# Patient Record
Sex: MALE | Race: WHITE | Employment: OTHER | URBAN - METROPOLITAN AREA
[De-identification: names, ages, dates, MRNs, and addresses within clinical notes are randomized per-mention and may not be internally consistent; named-entity substitution may affect disease eponyms.]

---

## 2023-07-13 ENCOUNTER — HOSPITAL ENCOUNTER (OUTPATIENT)
Age: 88
Setting detail: THERAPIES SERIES
Discharge: HOME | End: 2023-07-13
Payer: MEDICARE

## 2023-07-13 PROCEDURE — 97162 PT EVAL MOD COMPLEX 30 MIN: CPT

## 2023-07-13 NOTE — CONSULTS
Falls  Exercises:  Exercise Reps/ Time Weight/ Level Comments   Timed Gait 113 Feet  1 Minute And 16 Seconds    Number of Steps In 30 Feet 29 And 25     Walking With Theraband For Resisted Hip Flexion      Sit To Stands                                                      Solo Step      Standing Feet Together      Standing With Trunk AROM      Standing Placing One Foot On A Step With Decreasing UE Support      Step-Ups                                                  Pt. Education:  [x] Plans/Goals, Risks/Benefits discussed  [] Home exercise program:   Method of Education: [x] Verbal  [] Demo  [] Written  Comprehension of Education:  [] Verbalizes understanding. [] Demonstrates understanding. [] Needs Review. [] Demonstrates/verbalizes understanding of HEP/Ed previously given. Specific Instructions for next treatment[de-identified] Progress Gait And Therapeutic Exercises. Evaluation Complexity:  History (Personal factors, comorbidities) [] 0 [x] 1-2 [] 3+   Exam (limitations, restrictions) [] 1-2 [x] 3 [] 4+   Clinical presentation (progression) [x] Stable [] Evolving  [] Unstable   Decision Making [] Low [x] Moderate [] High    [] Low Complexity [x] Moderate Complexity [] High Complexity       Treatment Charges: Mins Time In/Out Units   [x] Evaluation       []  Low       [x]  Moderate       []  High 45 0930/1015 1   []  Modalities      []  Ther Exercise      []  Manual Therapy      []  Ther Activities      []  Aquatics      []  Vasocompression      []  Other        TOTAL TREATMENT TIME: 39      Time in:0930     Time out:1015    Electronically signed by: Jose Luis Olvera PT        Physician Signature:________________________________Date:__________________  By signing above or cosigning this note, I have reviewed this plan of care and certify a need for medically necessary rehabilitation services.      *PLEASE SIGN ABOVE AND FAX BACK ALL PAGES*

## 2023-07-20 ENCOUNTER — HOSPITAL ENCOUNTER (OUTPATIENT)
Age: 88
Setting detail: THERAPIES SERIES
Discharge: HOME | End: 2023-07-20
Payer: MEDICARE

## 2023-07-20 NOTE — FLOWSHEET NOTE
[x] [address]: [phone]     Physical Therapy Cancel/No Show note    Date: 2023  Patient: Chino Vasquez  : 1935  MRN: [id number]    Visit Count:   Cancels/No Shows to date:     For today's appointment patient:    [x]  Cancelled    [] Rescheduled appointment    [] No-show     Reason given by patient:    []  Patient ill    []  Conflicting appointment    [] No transportation      [] Conflict with work    [] No reason given    [] Weather related    [] [id number]    [x] Other: confused by appointment time.   No transportation    Comments:        [x] Next appointment was confirmed    Electronically signed by: Chapito Long PTA

## 2023-07-24 ENCOUNTER — HOSPITAL ENCOUNTER (OUTPATIENT)
Age: 88
Setting detail: THERAPIES SERIES
Discharge: HOME | End: 2023-07-24
Payer: MEDICARE

## 2023-07-24 PROCEDURE — 97110 THERAPEUTIC EXERCISES: CPT

## 2023-07-24 PROCEDURE — 97116 GAIT TRAINING THERAPY: CPT

## 2023-07-26 ENCOUNTER — HOSPITAL ENCOUNTER (OUTPATIENT)
Age: 88
Setting detail: THERAPIES SERIES
Discharge: HOME | End: 2023-07-26
Payer: MEDICARE

## 2023-07-26 NOTE — FLOWSHEET NOTE
[x] [address]  [phone]  F: [phone]             Physical Therapy Daily Treatment Note    Date:  2023  Patient Name:  Alison Meier    :  1935  MRN: [id number]  Physician: Audelia Eisenmenger, MD                                 Insurance: [facility]  Medical Diagnosis: Low Back Pain                Rehab Codes: M54.50   Onset Date: January 15, 2023                                 Next 's appt: N/A  Visit# / total visits: ; Progress note for Medicare patient due at visit 10     Cancels/No Shows: 0/0    Subjective:    Pain:  [x] Yes  [] No Location: Right Thigh Pain Rating: (0-10 scale) 2/10  Pain altered Tx:  [x] No  [] Yes  Action:  Comments: Pt reports no adverse response to previous Therapy session. He states he has been trying to ambulate more often within his home using the 4-wheeled walker. He denies Falls. Objective: None  Modalities: None  Precautions: Fall  Exercises:  Exercise Reps/ Time Weight/ Level Comments   Timed Gait 146 Feet  1 Minute And 36 Seconds     Number of Steps In 30 Feet 25 And 23       Walking With Theraband For Resisted Hip Flexion  30 Feet x2  Blue     Sit To Stands  10                                                                                       Solo Step         Standing Feet Together  60 Seconds       Standing With Trunk AROM  10       Standing Placing One Foot On A Step With Decreasing UE Support  10  4 Inch Step  All The Way To No Hands   Step-Ups  10  4 Inch  With 4-Wheeled Walker   Other:      Treatment Charges: Mins Units   []  Modalities     [x]  Ther Exercise 30 2   []  Manual Therapy     []  Ther Activities     []  Neuro Re-ed     []  Vasocompression     [x] Gait 15 1   []  Other     Total Treatment time 45        Assessment: [x] Progressing toward goals. [] No change.      [] Other:  [x] Patient would continue to benefit from skilled physical therapy

## 2023-07-26 NOTE — FLOWSHEET NOTE
[x] [address] [facility]    [phone]  F: [phone]          Physical Therapy Cancel/No Show note    Date: 2023  Patient: Miriam Cash  : 1935  MRN: [id number]    Cancels/No Shows to date:     For today's appointment patient:    [x]  Cancelled    [] Rescheduled appointment    [] No-show     Reason given by patient:    []  Patient ill    []  Conflicting appointment    [] No transportation      [] Conflict with work    [] No reason given    [x] Weather related    [] COVID-19    [] Other:      Comments:        [x] Next appointment was confirmed    Electronically signed by: Jacqui Pate PT

## 2023-07-28 ENCOUNTER — HOSPITAL ENCOUNTER (OUTPATIENT)
Age: 88
Setting detail: THERAPIES SERIES
Discharge: HOME | End: 2023-07-28
Payer: MEDICARE

## 2023-07-28 PROCEDURE — 97116 GAIT TRAINING THERAPY: CPT

## 2023-07-28 PROCEDURE — 97110 THERAPEUTIC EXERCISES: CPT

## 2023-07-28 NOTE — FLOWSHEET NOTE
[x] [address]  [phone]  F: [phone]             Physical Therapy Daily Treatment Note    Date:  2023  Patient Name:  Diane Denver    :  1935  MRN: [id number]  Physician: Montez Cooper MD                                 Insurance: [facility]  Medical Diagnosis: Low Back Pain                Rehab Codes: M54.50   Onset Date: January 15, 2023                                 Next 's appt: N/A  Visit# / total visits: 3/24; Progress note for Medicare patient due at visit 10     Cancels/No Shows: 0/0    Subjective:    Pain:  [x] Yes  [] No Location: Right Thigh Pain Rating: (0-10 scale) 2/10  Pain altered Tx:  [x] No  [] Yes  Action:  Comments: Pt reports no adverse response to previous Therapy session. He states he has been trying to ambulate more often within his home using the 4-wheeled walker. He denies Falls. Objective: None  Modalities: None  Precautions: Fall  Exercises:  Exercise Reps/ Time Weight/ Level Comments   Timed Gait 146 Feet  1 Minute And 22 Seconds     Number of Steps In 30 Feet 20 And 19       Walking With Theraband For Resisted Hip Flexion  30 Feet x2  Blue     Sit To Stands  12                                                                                       Solo Step         Standing Feet Together  60 Seconds       Standing With Trunk AROM  10       Standing Placing One Foot On A Step With Decreasing UE Support  10  4 Inch Step  All The Way To No Hands   Step-Ups  10  4 Inch  With 4-Wheeled Walker   Other:      Treatment Charges: Mins Units   []  Modalities     [x]  Ther Exercise 30 2   []  Manual Therapy     []  Ther Activities     []  Neuro Re-ed     []  Vasocompression     [x] Gait 15 1   []  Other     Total Treatment time 45        Assessment: [x] Progressing toward goals. [] No change.      [] Other:  [x] Patient would continue to benefit from skilled physical therapy

## 2023-07-31 ENCOUNTER — HOSPITAL ENCOUNTER (OUTPATIENT)
Age: 88
Setting detail: THERAPIES SERIES
Discharge: HOME | End: 2023-07-31
Payer: MEDICARE

## 2023-07-31 ENCOUNTER — APPOINTMENT (OUTPATIENT)
Age: 88
End: 2023-07-31
Payer: MEDICARE

## 2023-07-31 PROCEDURE — 97110 THERAPEUTIC EXERCISES: CPT

## 2023-07-31 PROCEDURE — 97116 GAIT TRAINING THERAPY: CPT

## 2023-07-31 NOTE — FLOWSHEET NOTE
Physical Therapy Daily Treatment      Visit Count: 4  Plan of Care/Certification Dates: Initial: 5/8/2017 Through: 7/17/2017  Insurance Information:   Medicare is primary. No authorization is required. Check the Common Working / Emulator file for therapy cap $ amount.  PT/SLP: $0  OT: $0  Date checked:5/10/17  Secondary insurance:[employer]CHANGE/[employer] [id number]  Next Referring Provider Visit: TBD    Referred by: Dr. Kofi Mckeon MD  Medical Diagnosis (from order):  Hip flexor tendonitis, left [M76.892]   Insurance: 1. MEDICARE  2. [employer]    Date of Onset: new onset; 1.5 months ago  Diagnosis Precautions: none  Relevant co-morbidities and medications: History of schizoaffective disorder, depression, anxiety, seizure disorder- last seizure 11/214, HTN, Right RTC repair. Please refer to PTA Medication section of electronic health record.     Relevant Tests: Relevant diagnostic tests: plain film radiograph   4/27/17 FINDINGS:  Mild, moderate hip osteoarthritic changes noted, with subchondral  sclerosis, osteophytic spurring, and joint space narrowing.   No fracture or dislocation.     SUBJECTIVE     Current Pain: 1/10   Functional Change: Pain went up to 6/10 three times this last week and lasted 30 minutes when I was cleaning (points to lateral left hip near iliac crest)    OBJECTIVE   Gait: Patient ambulating with increased bilateral  hip flexion, decreased upright posture, with single point cane in right hand.       Norm Left Left   Date   Initial 5/31    Flexion 110-120  102  105   Extension 10-15 NT     Abduction 30-50  18*  30   Adduction 30 20 20    Internal Rotation 30-40  30 30   External Rotation 40-60  33 33    [standard testing positions unless otherwise noted]   Comments: * denotes pain    Treatment   *denotes added to home exercise program     Therapeutic Exercise:   Recumbent bike seat level 5, resistance level 3, 5 minutes  -subjective info taken  Side stepping with orange TB x 30  [x] [address]  [phone]  F: [phone]             Physical Therapy Daily Treatment Note    Date:  2023  Patient Name:  Tj Lal    :  1935  MRN: [id number]  Physician: Abdelrahman Alves MD                                 Insurance: [facility]  Medical Diagnosis: Low Back Pain                Rehab Codes: M54.50   Onset Date: January 15, 2023                                 Next 's appt: N/A  Visit# / total visits: ; Progress note for Medicare patient due at visit 10     Cancels/No Shows: 0/0    Subjective:    Pain:  [x] Yes  [] No Location: Right Thigh Pain Rating: (0-10 scale) 2/10  Pain altered Tx:  [x] No  [] Yes  Action:  Comments: Pt reports no adverse response to previous Therapy session. He states his Low Back has been bothering him more the past few days for no apparent reason. He continues to also c/o Right Groin pain with ambulation. Objective: None  Modalities: None  Precautions: Fall  Exercises:  Exercise Reps/ Time Weight/ Level Comments   Timed Gait 146 Feet  1 Minute And 15 Seconds     Number of Steps In 30 Feet  15, 17, 16 And 16       Walking With Theraband For Resisted Hip Flexion  30 Feet x4  Blue     Sit To Stands  12                                                                                       Solo Step         Walking Without Device  12 Feet  x4     Standing Feet Together  60 Seconds       Standing With Trunk AROM  10       Standing Placing One Foot On A Step With Decreasing UE Support  10  4 Inch Step  All The Way To No Hands   Step-Ups  10  4 Inch  With 4-Wheeled Walker   Other:      Treatment Charges: Mins Units   []  Modalities     [x]  Ther Exercise 30 2   []  Manual Therapy     []  Ther Activities     []  Neuro Re-ed     []  Vasocompression     [x] Gait 15 1   []  Other     Total Treatment time 45        Assessment: [x] Progressing toward goals.     [] No feet each direction  Standing hip EXT x 10 x 2 sets with VCs to prevent trunk movements  Standing tensor fascia srini stretch 30 sec x 2 each set*  Supine tensor fascia srini stretch 30 sec x 2*    Therapeutic Activity:  Proper standing posture with shoulders, buttocks and heels touching wall    Manual Therapy:   Foam roll to left tensor fascia srini      Current Home Program (not performed this date except as noted above):   Supine left hip flexor stretch 30 sec x 2  Seated HS stretch 30 sec x 2 each leg  Standing hip ABD  Standing hip EXT  Side lying hip series x 10 each: clams, reverse clams, hip ABD  Standing tensor fascia srini stretch 30 sec x 2 each set*  Supine tensor fascia srini stretch 30 sec x 2*  ASSESSMENT   Patient tolerating exercises well but is still experiencing tightness and pain reproduction in left tensor fascia srini.     Pain after treatment: 0/10  Result of above outlined education: Verbalizes understanding and Needs reinforcement      Goals:  To be obtained by end of this plan of care:  1. Patient independent with modified and progressed home exercise program.  2. Patient will decrease involved hip pain to 2/10 to aid in ambulating on level and unlevel surfaces, sitting/standing to cook/eat a meal and squatting for lifting for household independent living.   3. Patient will increase involved hip active range of motion to WFL° to aid in normalization of gait for independent living, completion of household tasks for independent living and sitting/standing to cook/eat a meal.  4. Patient will tolerate ambulating 30 minutes with minimal left hip pain with single point cane to aid in completion of household and community tasks for independent living.   Lower Extremity Functional Scale: Patient will complete form to reflect an improved score from initial score of 46 to greater than or equal to 56 (0=extreme difficulty; 80=no difficulty) to indicate pt reported improvement in  function/disability/impairment (minimal detectable change: 9 points).       PLAN   Continue manual to decrease restrictions in tensor fascia srini, gluteus medius, and anterior hip capsule   Review proper posture for gait and standing  Continue hip ABD and EXT strength exercises  Work on DC over next 1-3 visits  THERAPY DAILY BILLING   Primary Insurance: MEDICARE  Secondary Insurance: [employer]CHANGE    Evaluation Procedures:  No evaluation codes were used on this date of service    Timed Procedures:  Manual Therapy, 20 minutes  Therapeutic Exercise, 25 minutes    Untimed Procedures:  No untimed codes were used on this date of service     Total Treatment Time: 45minutes      G-Code:  G-Code Score ABN form  insurance type does not require G-codes  Modifier based on outcome measure(s)/functional testing/clinical judgement as listed above    The referring provider's electronic or written signature on the evaluation authorizes the therapy plan of care and certifies the need for these services, furnished under this plan of care while under their care.  Electronically sent for physician signature

## 2023-08-03 ENCOUNTER — HOSPITAL ENCOUNTER (OUTPATIENT)
Age: 88
Setting detail: THERAPIES SERIES
Discharge: HOME | End: 2023-08-03
Payer: MEDICARE

## 2023-08-03 PROCEDURE — 97116 GAIT TRAINING THERAPY: CPT

## 2023-08-03 PROCEDURE — 97110 THERAPEUTIC EXERCISES: CPT

## 2023-08-03 NOTE — FLOWSHEET NOTE
[x] [address] [facility]  [phone]  F: [phone]             Physical Therapy Daily Treatment Note    Date:  2023  Patient Name:  Sheree Rowley    :  1935  MRN: [id number]  Physician: Shaun Perry MD                                 Insurance: [facility]  Medical Diagnosis: Low Back Pain                Rehab Codes: M54.50   Onset Date: January 15, 2023                                 Next 's appt: N/A  Visit# / total visits: ; Progress note for Medicare patient due at visit 10     Cancels/No Shows: 0/0    Subjective:    Pain:  [x] Yes  [] No Location: Right Thigh Pain Rating: (0-10 scale) 2/10  Pain altered Tx:  [x] No  [] Yes  Action:  Comments: Pt reports no adverse response to previous Therapy session. He states he continues to have Right Groin pain with ambulation. Objective: None  Modalities: None  Precautions: Fall  Exercises:  Exercise Reps/ Time Weight/ Level Comments   Timed Gait 146 Feet  1 Minute And 16 Seconds     Number of Steps In 30 Feet  20, 17, 17 And 16       Walking With Theraband For Resisted Hip Flexion  30 Feet x4  Blue     Sit To Stands  12                                                                                       Solo Step         Walking Without Device  12 Feet  x4     Standing Feet Together  60 Seconds       Standing With Trunk AROM  10       Standing Placing One Foot On A Step With Decreasing UE Support  10  4 Inch Step  All The Way To No Hands   Step-Ups  10  4 Inch  With 4-Wheeled Walker   Other:      Treatment Charges: Mins Units   []  Modalities     [x]  Ther Exercise 30 2   []  Manual Therapy     []  Ther Activities     []  Neuro Re-ed     []  Vasocompression     [x] Gait 15 1   []  Other     Total Treatment time 45        Assessment: [x] Progressing toward goals. [] No change.      [] Other:  [x] Patient would continue to benefit from skilled physical therapy

## 2023-08-07 ENCOUNTER — HOSPITAL ENCOUNTER (OUTPATIENT)
Age: 88
Setting detail: THERAPIES SERIES
Discharge: HOME | End: 2023-08-07
Payer: MEDICARE

## 2023-08-07 PROCEDURE — 97110 THERAPEUTIC EXERCISES: CPT

## 2023-08-07 PROCEDURE — 97116 GAIT TRAINING THERAPY: CPT

## 2023-08-07 NOTE — FLOWSHEET NOTE
[x] [address] [facility]  [phone]  F: [phone]             Physical Therapy Daily Treatment Note    Date:  2023  Patient Name:  Yimi White    :  1935  MRN: [id number]  Physician: Xuan Devine MD                                 Insurance: [facility]  Medical Diagnosis: Low Back Pain                Rehab Codes: M54.50   Onset Date: January 15, 2023                                 Next 's appt: N/A  Visit# / total visits: ; Progress note for Medicare patient due at visit 10     Cancels/No Shows: 0/0    Subjective:    Pain:  [x] Yes  [] No Location: Right Thigh Pain Rating: (0-10 scale) 2/10  Pain altered Tx:  [x] No  [] Yes  Action:  Comments: Pt reports no adverse response to previous Therapy session. He states he is not overly tired after Therapy. He denies Right Hip pain at present.     Objective: None  Modalities: None  Precautions: Fall  Exercises:  Exercise Reps/ Time Weight/ Level Comments   Timed Gait 284 Feet  2 Minute And 12 Seconds     Number of Steps In 30 Feet  18, 17, 17 And 17       Walking With Theraband For Resisted Hip Flexion  143 Feet   Blue     Sit To Stands  15                                                                                       Solo Step         Walking Without Device  12 Feet  x4     Standing Feet Together  60 Seconds       Standing With Trunk AROM  10       Standing Placing One Foot On A Step With Decreasing UE Support  10  4 Inch Step  All The Way To No Hands   Step-Ups  10  4 Inch  With 4-Wheeled Walker   Other:      Treatment Charges: Mins Units   []  Modalities     [x]  Ther Exercise 30 2   []  Manual Therapy     []  Ther Activities     []  Neuro Re-ed     []  Vasocompression     [x] Gait 15 1   []  Other     Total Treatment time 45        Assessment: Pt presents this date reporting improving Gait and Balance with no Falls or Near Falls since his previous Therapy

## 2023-08-09 ENCOUNTER — HOSPITAL ENCOUNTER (OUTPATIENT)
Age: 88
Setting detail: THERAPIES SERIES
Discharge: HOME | End: 2023-08-09
Payer: MEDICARE

## 2023-08-09 PROCEDURE — 97110 THERAPEUTIC EXERCISES: CPT

## 2023-08-09 PROCEDURE — 97116 GAIT TRAINING THERAPY: CPT

## 2023-08-09 NOTE — FLOWSHEET NOTE
[x] [address]  [phone]  F: [phone]             Physical Therapy Daily Treatment Note    Date:  2023  Patient Name:  Mine Mail    :  1935  MRN: [id number]  Physician: Priscilla Rothman MD                                 Insurance: [facility]  Medical Diagnosis: Low Back Pain                Rehab Codes: M54.50   Onset Date: January 15, 2023                                 Next 's appt: N/A  Visit# / total visits: ; Progress note for Medicare patient due at visit 10     Cancels/No Shows: 0/0    Subjective:    Pain:  [x] Yes  [] No Location: Right Thigh Pain Rating: (0-10 scale) 2/10  Pain altered Tx:  [x] No  [] Yes  Action:  Comments: Pt reports no adverse response to previous Therapy session. He states he has been experiencing intermittent Right Groin pain with certain movements/activites. Objective: None  Modalities: None  Precautions: Fall  Exercises:  Exercise Reps/ Time Weight/ Level Comments   Timed Gait 284 Feet  2 Minute And 16 Seconds     Number of Steps In 30 Feet  16, 15, 15 And 16       Walking With Theraband For Resisted Hip Flexion  143 Feet   Blue     Sit To Stands  15                                                                                       Solo Step         Walking Without Device  12 Feet  x4     Standing Feet Together  60 Seconds       Standing With Trunk AROM  10       Standing Placing One Foot On A Step With Decreasing UE Support  10  4 Inch Step  All The Way To No Hands   Step-Ups  10  6 Inch  With 4-Wheeled Walker   Other:      Treatment Charges: Mins Units   []  Modalities     [x]  Ther Exercise 30 2   []  Manual Therapy     []  Ther Activities     []  Neuro Re-ed     []  Vasocompression     [x] Gait 15 1   []  Other     Total Treatment time 45        Assessment: Progressed Gait and Exercises this date. No Goals Met.     STG: (to be met in 24 treatments)  Improved

## 2023-08-11 ENCOUNTER — HOSPITAL ENCOUNTER (OUTPATIENT)
Age: 88
Setting detail: THERAPIES SERIES
Discharge: HOME | End: 2023-08-11
Payer: MEDICARE

## 2023-08-11 PROCEDURE — 97116 GAIT TRAINING THERAPY: CPT

## 2023-08-11 PROCEDURE — 97110 THERAPEUTIC EXERCISES: CPT

## 2023-08-11 NOTE — FLOWSHEET NOTE
[x] [address]  [phone]  F: [phone]             Physical Therapy Daily Treatment Note    Date:  2023  Patient Name:  Janene Aguilar    :  1935  MRN: [id number]  Physician: Makeda Schaffer MD                                 Insurance: [facility]  Medical Diagnosis: Low Back Pain                Rehab Codes: M54.50   Onset Date: January 15, 2023                                 Next 's appt: N/A  Visit# / total visits: ; Progress note for Medicare patient due at visit 10     Cancels/No Shows: 0/0    Subjective:    Pain:  [x] Yes  [] No Location: Right Thigh Pain Rating: (0-10 scale) 2/10  Pain altered Tx:  [x] No  [] Yes  Action:  Comments: Pt reports no adverse response to previous Therapy session. He states he has been experiencing intermittent Right Groin pain with certain movements/activites. Objective: None  Modalities: None  Precautions: Fall  Exercises:  Exercise Reps/ Time Weight/ Level Comments   Timed Gait 286 Feet  2 Minute And 25 Seconds     Number of Steps In 30 Feet  17, 17, 17 And 16       Walking With Theraband For Resisted Hip Flexion  143 Feet   Blue     Sit To Stands  15                                                                                       Solo Step         Walking Without Device  12 Feet  x4     Standing Feet Together  60 Seconds       Standing With Trunk AROM  10       Standing Placing One Foot On A Step With Decreasing UE Support  10  4 Inch Step  All The Way To No Hands   Step-Ups  10  6 Inch  With 4-Wheeled Walker   Other:      Treatment Charges: Mins Units   []  Modalities     [x]  Ther Exercise 30 2   []  Manual Therapy     []  Ther Activities     []  Neuro Re-ed     []  Vasocompression     [x] Gait 15 1   []  Other     Total Treatment time 45        Assessment: Progressed Gait and Exercises this date. No Goals Met.     STG: (to be met in 24 treatments)  Improved

## 2023-08-14 ENCOUNTER — HOSPITAL ENCOUNTER (OUTPATIENT)
Age: 88
Setting detail: THERAPIES SERIES
Discharge: HOME | End: 2023-08-14
Payer: MEDICARE

## 2023-08-14 PROCEDURE — 97110 THERAPEUTIC EXERCISES: CPT

## 2023-08-14 PROCEDURE — 97116 GAIT TRAINING THERAPY: CPT

## 2023-08-16 ENCOUNTER — HOSPITAL ENCOUNTER (OUTPATIENT)
Age: 88
Setting detail: THERAPIES SERIES
Discharge: HOME | End: 2023-08-16
Payer: MEDICARE

## 2023-08-16 PROCEDURE — 97116 GAIT TRAINING THERAPY: CPT

## 2023-08-16 PROCEDURE — 97110 THERAPEUTIC EXERCISES: CPT

## 2023-08-16 NOTE — FLOWSHEET NOTE
understanding. [x] Needs review. [] Demonstrates/verbalizes HEP/Ed previously given. Plan: [x] Continue current frequency toward long and short term goals. [x] Specific Instructions for subsequent treatments: Progress Gait and Exercises.       Time In:1330          Time Out: 1415    Electronically signed by:  Rich Montoya PT

## 2023-08-16 NOTE — FLOWSHEET NOTE
Goals Met. STG: (to be met in 24 treatments)  Improved Timed Gait to 143 Feet in 1 Minute with 4-Wheeled Rolling Walker to decrease risk of Falls. Improve Number of Steps in 30 Feet to 21 Steps to decrease risk of Falls. Improve Bilat Knee Extensor Strength to 4+/5 to facilitate Arising and Gait. Independent with Home Exercise Programs. LTG: (to be met in 24 treatments)  Improve Tinetti Balance Assessment Tool Score from 9/28 to 15/28 to improve Gait and decrease risk of Falls. Pt. Education:  [x] Yes  [] No  [] Reviewed Prior HEP/Ed  Method of Education: [x] Verbal  [x] Demo  [] Written  Comprehension of Education:  [x] Verbalizes understanding. [x] Demonstrates understanding. [x] Needs review. [] Demonstrates/verbalizes HEP/Ed previously given. Plan: [x] Continue current frequency toward long and short term goals. [x] Specific Instructions for subsequent treatments: Progress Gait and Exercises.       Time In:1415          Time Out: 1500    Electronically signed by:  Sonya Dailey, PT

## 2023-08-18 ENCOUNTER — HOSPITAL ENCOUNTER (OUTPATIENT)
Age: 88
Setting detail: THERAPIES SERIES
Discharge: HOME | End: 2023-08-18
Payer: MEDICARE

## 2023-08-18 PROCEDURE — 97110 THERAPEUTIC EXERCISES: CPT

## 2023-08-18 PROCEDURE — 97116 GAIT TRAINING THERAPY: CPT

## 2023-08-18 NOTE — FLOWSHEET NOTE
[x] [address] [facility]  [phone]  F: [phone]             Physical Therapy Daily Treatment Note    Date:  2023  Patient Name:  Robinson Chisholm    :  1935  MRN: [id number]  Physician: Howard Mane MD                                 Insurance: [facility]  Medical Diagnosis: Low Back Pain                Rehab Codes: M54.50   Onset Date: January 15, 2023                                 Next 's appt: N/A  Visit# / total visits: ; Progress note for Medicare patient due at visit 10     Cancels/No Shows: 0/0    Subjective:    Pain:  [x] Yes  [] No Location: Right Thigh Pain Rating: (0-10 scale) 0/10  Pain altered Tx:  [x] No  [] Yes  Action:  Comments: Pt reports no adverse response to previous Therapy session. He states he has gone out twice walking in the community with his spouse while shopping at large box stores. He denies Right Hip pain at present, however notes pain with certain movements. Objective:   Pt presents ambulating with a 4-Wheeled Rolling Walker demonstrating decreased Bilat Knee Extension during Stance Phases of Gait. He demonstrates decreased Heel Strike Bilat with Bilat AFOs. Pt does not consistently pass Stance Foot with Swing Foot. Standing Posture: Right Shift and Forward Flexed with decreased Lumbar Lordosis. Timed Gait: 113 Feet in 1 Minute and 16 Seconds with 4-Wheeled Rolling Walker. Number of Steps in 30 Feet: 34 and 25 Steps. LE Strength: Hip Flexors Right 4/5 and Left 4+/5, Hip IR 4/5 Bilat, Hip ER 4/5, Knee Extensors 4/5 Bilat, and Knee Flexors 4/5 Bilat. SLR: 35 degrees Bilat  Hip PROM: Right Hip IR and ER produce Right Hip pain. Right Hip Scour (+)  Standing Static Balance without UE support: Poor  Standing Dynamic Balance without UE support: Poor  Tinetti Balance Assessment Tool Score: 9/28  Above recorded at Initial Evaluation on 2023.     Pt presents ambulating

## 2023-08-21 ENCOUNTER — HOSPITAL ENCOUNTER (OUTPATIENT)
Age: 88
Setting detail: THERAPIES SERIES
Discharge: HOME | End: 2023-08-21
Payer: MEDICARE

## 2023-08-21 PROCEDURE — 97110 THERAPEUTIC EXERCISES: CPT

## 2023-08-21 PROCEDURE — 97116 GAIT TRAINING THERAPY: CPT

## 2023-08-21 NOTE — FLOWSHEET NOTE
Demonstrates understanding. [x] Needs review. [] Demonstrates/verbalizes HEP/Ed previously given. Plan: [x] Continue current frequency toward long and short term goals. [x] Specific Instructions for subsequent treatments: Progress Gait and Exercises.       Time [id number]          Time Out: 5577    Electronically signed by:  Ziggy Erickson PT

## 2023-08-23 ENCOUNTER — HOSPITAL ENCOUNTER (OUTPATIENT)
Age: 88
Setting detail: THERAPIES SERIES
Discharge: HOME | End: 2023-08-23
Payer: MEDICARE

## 2023-08-23 PROCEDURE — 97116 GAIT TRAINING THERAPY: CPT

## 2023-08-23 PROCEDURE — 97110 THERAPEUTIC EXERCISES: CPT

## 2023-08-23 NOTE — FLOWSHEET NOTE
a 4-Wheeled Rolling Walker demonstrating decreased Bilat Knee Extension during Stance Phase of Gait. Timed Gait: 143 Feet in 1 Minute and 10 Seconds. Number of Steps in 30 Feet: 22 Steps. Tinetti Balance Assessment Tool Score: 11/28  Above recorded at Progress Summary on August 16, 2023. Modalities: None  Precautions: Fall  Exercises:  Exercise Reps/ Time Weight/ Level Comments   Timed Gait 286 Feet  2 Minute And 17 Seconds     Number of Steps In 30 Feet  15, 16, 16 And 15       Walking With Theraband For Resisted Hip Flexion  286 Feet   Blue     Sit To Stands  15                                                                                       Solo Step         Walking With [employer]  12 Feet    x6     Standing Feet Together  60 Seconds       Standing With Trunk AROM  10       Standing Placing One Foot On A Step With St [place]  10  4 Inch Step     Step-Ups At Steps  10  6 Inch  With Bilat Hand Rails   Other:      Treatment Charges: Mins Units   []  Modalities     [x]  Ther Exercise 30 2   []  Manual Therapy     []  Ther Activities     []  Neuro Re-ed     []  Vasocompression     [x] Gait 15 1   []  Other     Total Treatment time 45        Assessment:  Progressing towards Short Term Goals. Continued with [employer] in Solo Step this date versus without Assistive Device. STG: (to be met in 24 treatments)  Improved Timed Gait to 143 Feet in 1 Minute with 4-Wheeled Rolling Walker to decrease risk of Falls. Improve Number of Steps in 30 Feet to 21 Steps to decrease risk of Falls. Improve Bilat Knee Extensor Strength to 4+/5 to facilitate Arising and Gait. Independent with Home Exercise Programs. LTG: (to be met in 24 treatments)  Improve Tinetti Balance Assessment Tool Score from 9/28 to 15/28 to improve Gait and decrease risk of Falls.        Pt. Education:  [x] Yes  [] No  [] Reviewed Prior HEP/Ed  Method of Education: [x] Verbal  [x] Demo  [] Written  Comprehension of Education:  [x] [employer]

## 2023-08-28 ENCOUNTER — HOSPITAL ENCOUNTER (OUTPATIENT)
Age: 88
Setting detail: THERAPIES SERIES
Discharge: HOME | End: 2023-08-28
Payer: MEDICARE

## 2023-08-28 PROCEDURE — 97110 THERAPEUTIC EXERCISES: CPT

## 2023-08-28 PROCEDURE — 97116 GAIT TRAINING THERAPY: CPT

## 2023-08-28 NOTE — FLOWSHEET NOTE
Initial Evaluation on July 13, 2023. Pt presents ambulating with a 4-Wheeled Rolling Walker demonstrating decreased Bilat Knee Extension during Stance Phase of Gait. Timed Gait: 143 Feet in 1 Minute and 10 Seconds. Number of Steps in 30 Feet: 22 Steps. Tinetti Balance Assessment Tool Score: 11/28  Above recorded at Progress Summary on August 16, 2023. Modalities: None  Precautions: Fall  Exercises:  Exercise Reps/ Time Weight/ Level Comments   Timed Gait 286 Feet  2 Minute And 12 Seconds     Number of Steps In 30 Feet  15, 16, 15 And 15       Walking With Theraband For Resisted Hip Flexion  286 Feet   Blue     Sit To Stands  15                                                                                       Solo Step         Walking With [employer]  12 Feet    x6     Standing Feet Together  60 Seconds       Standing With Trunk AROM  10       Standing Placing One Foot On A Step With St [place]ane  10  4 Inch Step     Step-Ups At Steps  10  6 Inch  With Bilat Hand Rails   Other:      Treatment Charges: Mins Units   []  Modalities     [x]  Ther Exercise 30 2   []  Manual Therapy     []  Ther Activities     []  Neuro Re-ed     []  Vasocompression     [x] Gait 15 1   []  Other     Total Treatment time 45        Assessment:  Progressing towards Short Term Goals. Continued with [employer] in Solo Step this date versus without Assistive Device. STG: (to be met in 24 treatments)  Improved Timed Gait to 143 Feet in 1 Minute with 4-Wheeled Rolling Walker to decrease risk of Falls. Improve Number of Steps in 30 Feet to 21 Steps to decrease risk of Falls. Improve Bilat Knee Extensor Strength to 4+/5 to facilitate Arising and Gait. Independent with Home Exercise Programs. LTG: (to be met in 24 treatments)  Improve Tinetti Balance Assessment Tool Score from 9/28 to 15/28 to improve Gait and decrease risk of Falls.        Pt. Education:  [x] Yes  [] No  [] Reviewed Prior HEP/Ed  Method of Education: [x] Verbal  [x] Demo

## 2023-08-30 ENCOUNTER — HOSPITAL ENCOUNTER (OUTPATIENT)
Age: 88
Setting detail: THERAPIES SERIES
Discharge: HOME | End: 2023-08-30
Payer: MEDICARE

## 2023-08-30 PROCEDURE — 97110 THERAPEUTIC EXERCISES: CPT

## 2023-08-30 PROCEDURE — 97116 GAIT TRAINING THERAPY: CPT

## 2023-08-30 NOTE — FLOWSHEET NOTE
[x] [address] [facility]  [phone]  F: [phone]             Physical Therapy Daily Treatment Note    Date:  2023  Patient Name:  Darcie Velasquez    :  1935  MRN: [id number]  Physician: Noah Rogers MD                                 Insurance: [facility]  Medical Diagnosis: Low Back Pain                Rehab Codes: M54.50   Onset Date: January 15, 2023                                 Next 's appt: 2023  Visit# / total visits: 15/24; Progress note for Medicare patient due at visit 20     Cancels/No Shows: 0/0    Subjective:    Pain:  [x] Yes  [] No Location: Right Thigh Pain Rating: (0-10 scale) 0/10  Pain altered Tx:  [x] No  [] Yes  Action:  Comments: Pt reports no adverse response to previous Therapy session. He states he meets with Dr. Gail Christina Thursday to have his Right Hip injected secondary to Trochanteric Bursitis. He states his Right Hip has been painful the past day or so. Objective:   Pt presents ambulating with a 4-Wheeled Rolling Walker demonstrating decreased Bilat Knee Extension during Stance Phases of Gait. He demonstrates decreased Heel Strike Bilat with Bilat AFOs. Pt does not consistently pass Stance Foot with Swing Foot. Standing Posture: Right Shift and Forward Flexed with decreased Lumbar Lordosis. Timed Gait: 113 Feet in 1 Minute and 16 Seconds with 4-Wheeled Rolling Walker. Number of Steps in 30 Feet: 34 and 25 Steps. LE Strength: Hip Flexors Right 4/5 and Left 4+/5, Hip IR 4/5 Bilat, Hip ER 4/5, Knee Extensors 4/5 Bilat, and Knee Flexors 4/5 Bilat. SLR: 35 degrees Bilat  Hip PROM: Right Hip IR and ER produce Right Hip pain. Right Hip Scour (+)  Standing Static Balance without UE support: Poor  Standing Dynamic Balance without UE support: Poor  Tinetti Balance Assessment Tool Score: 9/28  Above recorded at Initial Evaluation on 2023.     Pt presents ambulating with

## 2023-09-01 ENCOUNTER — APPOINTMENT (OUTPATIENT)
Age: 88
End: 2023-09-01
Payer: MEDICARE

## 2023-09-06 ENCOUNTER — HOSPITAL ENCOUNTER (OUTPATIENT)
Age: 88
Setting detail: THERAPIES SERIES
Discharge: HOME | End: 2023-09-06
Payer: MEDICARE

## 2023-09-06 PROCEDURE — 97116 GAIT TRAINING THERAPY: CPT

## 2023-09-06 PROCEDURE — 97110 THERAPEUTIC EXERCISES: CPT

## 2023-09-06 NOTE — FLOWSHEET NOTE
[x] [address]  [phone]  F: [phone]             Physical Therapy Daily Treatment Note    Date:  2023  Patient Name:  Chris Flores    :  1935  MRN: [id number]  Physician: Rubin Wise MD                                 Insurance: [facility]  Medical Diagnosis: Low Back Pain                Rehab Codes: M54.50   Onset Date: January 15, 2023                                 Next 's appt: 2023  Visit# / total visits: ; Progress note for Medicare patient due at visit 20     Cancels/No Shows: 0/0    Subjective:    Pain:  [x] Yes  [] No Location: Right Thigh Pain Rating: (0-10 scale) 0/10  Pain altered Tx:  [x] No  [] Yes  Action:  Comments: Pt reports no adverse response to previous Therapy session. He states he met with Dr. Faith Farrar last Thursday and received a Steroid Injection for Right Hip Bursitis with little effect on his overall Right Hip pain. He states he is fatigued today secondary to spending the weekend moving from his home to an [address] [place] [address] facility. Objective:   Pt presents ambulating with a 4-Wheeled Rolling Walker demonstrating decreased Bilat Knee Extension during Stance Phases of Gait. He demonstrates decreased Heel Strike Bilat with Bilat AFOs. Pt does not consistently pass Stance Foot with Swing Foot. Standing Posture: Right Shift and Forward Flexed with decreased Lumbar Lordosis. Timed Gait: 113 Feet in 1 Minute and 16 Seconds with 4-Wheeled Rolling Walker. Number of Steps in 30 Feet: 34 and 25 Steps. LE Strength: Hip Flexors Right 4/5 and Left 4+/5, Hip IR 4/5 Bilat, Hip ER 4/5, Knee Extensors 4/5 Bilat, and Knee Flexors 4/5 Bilat. SLR: 35 degrees Bilat  Hip PROM: Right Hip IR and ER produce Right Hip pain.   Right Hip Scour (+)  Standing Static Balance without UE support: Poor  Standing Dynamic Balance without UE support: Poor  Tinetti Balance Assessment

## 2023-09-08 ENCOUNTER — HOSPITAL ENCOUNTER (OUTPATIENT)
Age: 88
Setting detail: THERAPIES SERIES
Discharge: HOME | End: 2023-09-08
Payer: MEDICARE

## 2023-09-08 PROCEDURE — 97116 GAIT TRAINING THERAPY: CPT

## 2023-09-08 PROCEDURE — 97110 THERAPEUTIC EXERCISES: CPT

## 2023-09-08 NOTE — FLOWSHEET NOTE
[x] [address]  [phone]  F: [phone]             Physical Therapy Daily Treatment Note    Date:  2023  Patient Name:  Gerard Mac    :  1935  MRN: [id number]  Physician: Eufemia Cabezas MD                                 Insurance: [facility]  Medical Diagnosis: Low Back Pain                Rehab Codes: M54.50   Onset Date: January 15, 2023                                 Next 's appt: 2023  Visit# / total visits: ; Progress note for Medicare patient due at visit 20     Cancels/No Shows: 0/0    Subjective:    Pain:  [x] Yes  [] No Location: Right Thigh Pain Rating: (0-10 scale) 0/10  Pain altered Tx:  [x] No  [] Yes  Action:  Comments: Pt reports no adverse response to previous Therapy session. He states he met with Dr. Juan Miguel Conrad last Thursday and received a Steroid Injection for Right Hip Bursitis with little effect on his overall Right Hip pain. He states he is fatigued today secondary to spending the weekend moving from his home to an [address] [place] [address] facility. Objective:   Pt presents ambulating with a 4-Wheeled Rolling Walker demonstrating decreased Bilat Knee Extension during Stance Phases of Gait. He demonstrates decreased Heel Strike Bilat with Bilat AFOs. Pt does not consistently pass Stance Foot with Swing Foot. Standing Posture: Right Shift and Forward Flexed with decreased Lumbar Lordosis. Timed Gait: 113 Feet in 1 Minute and 16 Seconds with 4-Wheeled Rolling Walker. Number of Steps in 30 Feet: 34 and 25 Steps. LE Strength: Hip Flexors Right 4/5 and Left 4+/5, Hip IR 4/5 Bilat, Hip ER 4/5, Knee Extensors 4/5 Bilat, and Knee Flexors 4/5 Bilat. SLR: 35 degrees Bilat  Hip PROM: Right Hip IR and ER produce Right Hip pain.   Right Hip Scour (+)  Standing Static Balance without UE support: Poor  Standing Dynamic Balance without UE support: Poor  Tinetti Balance Assessment

## 2023-09-11 ENCOUNTER — HOSPITAL ENCOUNTER (OUTPATIENT)
Age: 88
Setting detail: THERAPIES SERIES
Discharge: HOME | End: 2023-09-11
Payer: MEDICARE

## 2023-09-11 PROCEDURE — 97110 THERAPEUTIC EXERCISES: CPT

## 2023-09-11 PROCEDURE — 97116 GAIT TRAINING THERAPY: CPT

## 2023-09-11 NOTE — FLOWSHEET NOTE
decreased Bilat Knee Extension during Stance Phase of Gait. Timed Gait: 143 Feet in 1 Minute and 10 Seconds. Number of Steps in 30 Feet: 22 Steps. Tinetti Balance Assessment Tool Score: 11/28  Above recorded at Progress Summary on August 16, 2023. Modalities: None  Precautions: Fall  Exercises:  Exercise Reps/ Time Weight/ Level Comments   Timed Gait 286 Feet  1 Minute And 59 Seconds     Number of Steps In 30 Feet  15, 15, 14 And 14       Walking With Theraband For Resisted Hip Flexion  286 Feet   Blue     Sit To Stands  15                 Stairs (4)  2                                                                   Solo Step         Walking With [employer] 12 Feet    x6     Standing Feet Together  60 Seconds       Standing With Trunk AROM  10       Standing Placing One Foot On A Step With St Cane  10  4 Inch Step     Step-Ups At Steps  10  6 Inch  With Bilat Hand Rails   Other:      Treatment Charges: Mins Units   []  Modalities     [x]  Ther Exercise 30 2   []  Manual Therapy     []  Ther Activities     []  Neuro Re-ed     []  Vasocompression     [x] Gait 15 1   []  Other     Total Treatment time 45        Assessment:  Progressing towards Short Term Goals. Continued with [employer] in Solo Step this date versus without Assistive Device. Pt continues to c/o Right Hip pain in WB despite receiving a Steroid Injection one week ago. No Goals Met. STG: (to be met in 24 treatments)  Improved Timed Gait to 143 Feet in 1 Minute with 4-Wheeled Rolling Walker to decrease risk of Falls. Improve Number of Steps in 30 Feet to 21 Steps to decrease risk of Falls. Improve Bilat Knee Extensor Strength to 4+/5 to facilitate Arising and Gait. Independent with Home Exercise Programs. LTG: (to be met in 24 treatments)  Improve Tinetti Balance Assessment Tool Score from 9/28 to 15/28 to improve Gait and decrease risk of Falls.        Pt. Education:  [x] Yes  [] No  [] Reviewed Prior HEP/Ed  Method of Education: [x] Verbal

## 2023-09-13 ENCOUNTER — HOSPITAL ENCOUNTER (OUTPATIENT)
Age: 88
Setting detail: THERAPIES SERIES
Discharge: HOME | End: 2023-09-13
Payer: MEDICARE

## 2023-09-13 PROCEDURE — 97116 GAIT TRAINING THERAPY: CPT

## 2023-09-13 PROCEDURE — 97110 THERAPEUTIC EXERCISES: CPT

## 2023-09-15 ENCOUNTER — HOSPITAL ENCOUNTER (OUTPATIENT)
Age: 88
Setting detail: THERAPIES SERIES
Discharge: HOME | End: 2023-09-15
Payer: MEDICARE

## 2023-09-15 PROCEDURE — 97116 GAIT TRAINING THERAPY: CPT

## 2023-09-15 PROCEDURE — 97110 THERAPEUTIC EXERCISES: CPT

## 2023-09-15 NOTE — FLOWSHEET NOTE
[x] [address] [facility]  [phone]  F: [phone]             Physical Therapy Daily Treatment Note    Date:  2023  Patient Name:  Alexsander Hernadez    :  1935  MRN: [id number]  Physician: Shaun Perry MD                                 Insurance: [address] MEDICARE  Medical Diagnosis: Low Back Pain                Rehab Codes: M54.50   Onset Date: January 15, 2023                                 Next 's appt: 2023  Visit# / total visits: ; Progress note for Medicare patient due at visit 20     Cancels/No Shows: 0/0    Subjective:    Pain:  [x] Yes  [] No Location: Right Thigh Pain Rating: (0-10 scale) 0/10  Pain altered Tx:  [x] No  [] Yes  Action:  Comments: Pt reports no adverse response to previous Therapy session. He states he does not have Right Hip pain in weightbearing and with ambulating today. Objective:   Pt presents ambulating with a 4-Wheeled Rolling Walker demonstrating decreased Bilat Knee Extension during Stance Phases of Gait. He demonstrates decreased Heel Strike Bilat with Bilat AFOs. Pt does not consistently pass Stance Foot with Swing Foot. Standing Posture: Right Shift and Forward Flexed with decreased Lumbar Lordosis. Timed Gait: 113 Feet in 1 Minute and 16 Seconds with 4-Wheeled Rolling Walker. Number of Steps in 30 Feet: 34 and 25 Steps. LE Strength: Hip Flexors Right 4/5 and Left 4+/5, Hip IR 4/5 Bilat, Hip ER 4/5, Knee Extensors 4/5 Bilat, and Knee Flexors 4/5 Bilat. SLR: 35 degrees Bilat  Hip PROM: Right Hip IR and ER produce Right Hip pain. Right Hip Scour (+)  Standing Static Balance without UE support: Poor  Standing Dynamic Balance without UE support: Poor  Tinetti Balance Assessment Tool Score: 9/28  Above recorded at Initial Evaluation on 2023.     Pt presents ambulating with a 4-Wheeled Rolling Walker demonstrating decreased Bilat Knee Extension during Stance

## 2023-09-18 ENCOUNTER — HOSPITAL ENCOUNTER (OUTPATIENT)
Age: 88
Setting detail: THERAPIES SERIES
Discharge: HOME | End: 2023-09-18
Payer: MEDICARE

## 2023-09-18 PROCEDURE — 97110 THERAPEUTIC EXERCISES: CPT

## 2023-09-18 PROCEDURE — 97116 GAIT TRAINING THERAPY: CPT

## 2023-09-18 NOTE — FLOWSHEET NOTE
ambulating with a 4-Wheeled Rolling Walker demonstrating decreased Bilat Knee Extension during Stance Phase of Gait. Timed Gait: 143 Feet in 1 Minute and 10 Seconds. Number of Steps in 30 Feet: 22 Steps. Tinetti Balance Assessment Tool Score: 11/28  Above recorded at Progress Summary on August 16, 2023. Pt presents ambulating with 4-Wheeled Rolling Walker demonstrating decreased Bilat Knee Extension ROM during Stance Phase of Gait. Timed Gait: 143 Feet in 1 Minute and 15 Seconds with 4-Wheeled Walker. Number of Steps in 30 Feet: 22 Steps and 22 Steps. Tinetti Balance Assessment Tool Score: 16/28  Above recorded at Progress Summary on September 15, 2023. Modalities: None  Precautions: Fall  Exercises:  Exercise Reps/ Time Weight/ Level Comments   Timed Gait 286 Feet  1 Minute And 57 Seconds     Number of Steps In 30 Feet  14, 15, 15 And 14       Walking With Theraband For Resisted Hip Flexion  286 Feet   Blue     Sit To Stands  15+5    Working On Bringing [place] Forward Over His Feet             Stairs (4)  2                                                                   Solo Step         Walking With [employer] 12 Feet    x6     Standing Feet Together  60 Seconds       Standing With Trunk AROM  10       Standing Placing One Foot On A Step With St Cane  10  4 Inch Step     Step-Ups At Steps  10  6 Inch  With Bilat Hand Rails   Other:      Treatment Charges: Mins Units   []  Modalities     [x]  Ther Exercise 30 2   []  Manual Therapy     []  Ther Activities     []  Neuro Re-ed     []  Vasocompression     [x] Gait 15 1   []  Other     Total Treatment time 45        Assessment:  Pt's Right Hip pain appears consistent with Lumbar origin and presents mainly in weightbearing, especially with prolonged standing and unsupported gait. Short Term Goal #4 Met. Long Term Goal Met.      STG: (to be met in 24 treatments)  Improved Timed Gait to 143 Feet in 1 Minute with 4-Wheeled Rolling Walker to decrease risk of

## 2023-09-20 ENCOUNTER — HOSPITAL ENCOUNTER (OUTPATIENT)
Age: 88
Setting detail: THERAPIES SERIES
Discharge: HOME | End: 2023-09-20
Payer: MEDICARE

## 2023-09-20 PROCEDURE — 97116 GAIT TRAINING THERAPY: CPT

## 2023-09-20 PROCEDURE — 97110 THERAPEUTIC EXERCISES: CPT

## 2023-09-20 NOTE — FLOWSHEET NOTE
13, 2023. Pt presents ambulating with a 4-Wheeled Rolling Walker demonstrating decreased Bilat Knee Extension during Stance Phase of Gait. Timed Gait: 143 Feet in 1 Minute and 10 Seconds. Number of Steps in 30 Feet: 22 Steps. Tinetti Balance Assessment Tool Score: 11/28  Above recorded at Progress Summary on August 16, 2023. Pt presents ambulating with 4-Wheeled Rolling Walker demonstrating decreased Bilat Knee Extension ROM during Stance Phase of Gait. Timed Gait: 143 Feet in 1 Minute and 15 Seconds with 4-Wheeled Walker. Number of Steps in 30 Feet: 22 Steps and 22 Steps. Tinetti Balance Assessment Tool Score: 16/28  Above recorded at Progress Summary on September 15, 2023. Modalities: None  Precautions: Fall  Exercises:  Exercise Reps/ Time Weight/ Level Comments   Timed Gait 286 Feet  2 Minute And 03 Seconds     Number of Steps In 30 Feet  14, 15, 15 And 14  14, 14, 14 And 14     Walking With Theraband For Resisted Hip Flexion  286 Feet   Blue     Sit To Stands  20    Working On Bringing [place] Forward Over His Feet             Stairs (4)  2                                                                   Solo Step         Walking With [employer] 12 Feet    x6     Standing Feet Together  60 Seconds       Standing With Trunk AROM  10       Stepping Over [employer]  10  St Cane     Step-Ups At Steps  10  6 Inch  With [employer]   Other:      Treatment Charges: Mins Units   []  Modalities     [x]  Ther Exercise 30 2   []  Manual Therapy     []  Ther Activities     []  Neuro Re-ed     []  Vasocompression     [x] Gait 15 1   []  Other     Total Treatment time 45        Assessment:  Pt presents reporting improved Right Hip over the past week, however continues to note pain intermittently in weightbearing, especially with prolonged standing and unsupported gait. Short Term Goal #4 Met. Long Term Goal Met.      STG: (to be met in 24 treatments)  Improved Timed Gait to 143 Feet in 1 Minute with 4-Wheeled

## 2023-09-22 ENCOUNTER — HOSPITAL ENCOUNTER (OUTPATIENT)
Age: 88
Setting detail: THERAPIES SERIES
Discharge: HOME | End: 2023-09-22
Payer: MEDICARE

## 2023-09-22 PROCEDURE — 97116 GAIT TRAINING THERAPY: CPT

## 2023-09-22 PROCEDURE — 97110 THERAPEUTIC EXERCISES: CPT

## 2023-09-22 NOTE — FLOWSHEET NOTE
[x] [address]  [phone]  F: [phone]             Physical Therapy Daily Treatment Note    Date:  2023  Patient Name:  Marisol Pineda    :  1935  MRN: [id number]  Physician: Damian Carlson MD                                 Insurance: [facility]  Medical Diagnosis: Low Back Pain                Rehab Codes: M54.50   Onset Date: January 15, 2023                                 Next 's appt: 2023  Visit# / total visits: ; Progress note for Medicare patient due at visit 24     Cancels/No Shows: 0/0    Subjective:    Pain:  [x] Yes  [] No Location: Right Thigh Pain Rating: (0-10 scale) 0/10  Pain altered Tx:  [x] No  [] Yes  Action:  Comments: Pt reports no adverse response to previous Therapy session. He states he does not have Right Hip pain today. He does note intermittent \"jabs\" of Right Hip pain with certain movements. He states he has been able to ambulate without his Walker at times at home. Objective:   Pt presents ambulating with a 4-Wheeled Rolling Walker demonstrating decreased Bilat Knee Extension during Stance Phases of Gait. He demonstrates decreased Heel Strike Bilat with Bilat AFOs. Pt does not consistently pass Stance Foot with Swing Foot. with certain mo  Standing Posture: Right Shift and Forward Flexed with decreased Lumbar Lordosis. Timed Gait: 113 Feet in 1 Minute and 16 Seconds with 4-Wheeled Rolling Walker. Number of Steps in 30 Feet: 34 and 25 Steps. LE Strength: Hip Flexors Right 4/5 and Left 4+/5, Hip IR 4/5 Bilat, Hip ER 4/5, Knee Extensors 4/5 Bilat, and Knee Flexors 4/5 Bilat. SLR: 35 degrees Bilat  Hip PROM: Right Hip IR and ER produce Right Hip pain.   Right Hip Scour (+)  Standing Static Balance without UE support: Poor  Standing Dynamic Balance without UE support: Poor  Tinetti Balance Assessment Tool Score: 9/28  Above recorded at Initial Evaluation on

## 2023-09-22 NOTE — FLOWSHEET NOTE
[x] [address]  [phone]  F: [phone]             Physical Therapy Daily Treatment Note    Date:  2023  Patient Name:  Gerard Mac    :  1935  MRN: [id number]  Physician: Eufemia Cabezas MD                                 Insurance: [facility]  Medical Diagnosis: Low Back Pain                Rehab Codes: M54.50   Onset Date: January 15, 2023                                 Next 's appt: 2023  Visit# / total visits: ; Progress note for Medicare patient due at visit 24     Cancels/No Shows: 0/0    Subjective:    Pain:  [x] Yes  [] No Location: Right Thigh Pain Rating: (0-10 scale) 0/10  Pain altered Tx:  [x] No  [] Yes  Action:  Comments: Pt reports no adverse response to previous Therapy session. He states he does not have Right Hip pain today. He does note intermittent \"jabs\" of Right Hip pain with certain movements. He states he has been able to ambulate without his Walker at times at home. Objective:   Pt presents ambulating with a 4-Wheeled Rolling Walker demonstrating decreased Bilat Knee Extension during Stance Phases of Gait. He demonstrates decreased Heel Strike Bilat with Bilat AFOs. Pt does not consistently pass Stance Foot with Swing Foot. with certain mo  Standing Posture: Right Shift and Forward Flexed with decreased Lumbar Lordosis. Timed Gait: 113 Feet in 1 Minute and 16 Seconds with 4-Wheeled Rolling Walker. Number of Steps in 30 Feet: 34 and 25 Steps. LE Strength: Hip Flexors Right 4/5 and Left 4+/5, Hip IR 4/5 Bilat, Hip ER 4/5, Knee Extensors 4/5 Bilat, and Knee Flexors 4/5 Bilat. SLR: 35 degrees Bilat  Hip PROM: Right Hip IR and ER produce Right Hip pain.   Right Hip Scour (+)  Standing Static Balance without UE support: Poor  Standing Dynamic Balance without UE support: Poor  Tinetti Balance Assessment Tool Score: 9/28  Above recorded at Initial Evaluation on

## 2023-09-25 ENCOUNTER — HOSPITAL ENCOUNTER (OUTPATIENT)
Age: 88
Setting detail: THERAPIES SERIES
End: 2023-09-25
Payer: MEDICARE

## 2023-09-27 ENCOUNTER — HOSPITAL ENCOUNTER (OUTPATIENT)
Age: 88
Setting detail: THERAPIES SERIES
Discharge: HOME | End: 2023-09-27
Payer: MEDICARE

## 2023-09-27 PROCEDURE — 97110 THERAPEUTIC EXERCISES: CPT

## 2023-09-27 PROCEDURE — 97116 GAIT TRAINING THERAPY: CPT

## 2023-09-27 NOTE — FLOWSHEET NOTE
[x] [address] [facility]  [phone]  F: [phone]             Physical Therapy Daily Treatment Note/Progress Note    Date:  2023  Patient Name:  uGru Huang    :  1935  MRN: [id number]  Physician: Jean-Claude Guajardo MD                                 Insurance: [facility]  Medical Diagnosis: Low Back Pain                Rehab Codes: M54.50   Onset Date: January 15, 2023                                 Next 's appt: 2023  Visit# / total visits: ; Progress note for Medicare patient due at visit 24     Cancels/No Shows: 0/0    Subjective:    Pain:  [x] Yes  [] No Location: Right Thigh Pain Rating: (0-10 scale) 2/10  Pain altered Tx:  [x] No  [] Yes  Action:  Comments: Pt reports no adverse response to previous Therapy session. He states he continues to experience Right Thigh and Hip pain intermittently, especially in weightbearing. He states the pain does effect his Gait at times. He states he has been working on his Gait in the hallway at the [facility] he now resides in. He states he feels as though his Gait is improving in response to Physical Therapy and has not experienced a Fall since starting Therapy. He states he is also ambulating within his Apt. Without his Walker at times. Objective:   Pt presents ambulating with a 4-Wheeled Rolling Walker demonstrating decreased Bilat Knee Extension during Stance Phases of Gait. He demonstrates decreased Heel Strike Bilat with Bilat AFOs. Pt does not consistently pass Stance Foot with Swing Foot. with certain mo  Standing Posture: Right Shift and Forward Flexed with decreased Lumbar Lordosis. Timed Gait: 113 Feet in 1 Minute and 16 Seconds with 4-Wheeled Rolling Walker. Number of Steps in 30 Feet: 34 and 25 Steps.   LE Strength: Hip Flexors Right 4/5 and Left 4+/5, Hip IR 4/5 Bilat, Hip ER 4/5, Knee Extensors 4/5 Bilat, and Knee Flexors 4/5

## 2023-09-29 ENCOUNTER — HOSPITAL ENCOUNTER (OUTPATIENT)
Age: 88
Setting detail: THERAPIES SERIES
End: 2023-09-29
Payer: MEDICARE

## 2023-10-04 ENCOUNTER — HOSPITAL ENCOUNTER (OUTPATIENT)
Age: 88
Setting detail: THERAPIES SERIES
Discharge: HOME | End: 2023-10-04
Payer: MEDICARE

## 2023-10-04 PROCEDURE — 97116 GAIT TRAINING THERAPY: CPT

## 2023-10-04 PROCEDURE — 97110 THERAPEUTIC EXERCISES: CPT

## 2023-10-04 NOTE — FLOWSHEET NOTE
[x] [address] [facility]  [phone]  F: [phone]             Physical Therapy Daily Treatment Note/Progress Note    Date:  2023  Patient Name:  Marisol Pineda    :  1935  MRN: [id number]  Physician: Damian Carlson MD                                 Insurance: [address] MEDICARE  Medical Diagnosis: Low Back Pain                Rehab Codes: M54.50   Onset Date: January 15, 2023                                 Next 's appt: 2023  Visit# / total visits: ; Progress note for Medicare patient due at visit 30     Cancels/No Shows: 0/0    Subjective:    Pain:  [x] Yes  [] No Location: Right Thigh Pain Rating: (0-10 scale) 2/10  Pain altered Tx:  [x] No  [] Yes  Action:  Comments: Pt reports no adverse response to previous Therapy session. He states his Right Hip pain seems to have worsened over the past few days with the pain bothering him at night effecting his sleep. He states he ran out of his Meloxicam prescription last Thursday and feels this has contributed to the increase in his Right Thigh pain. Objective:   Pt presents ambulating with a 4-Wheeled Rolling Walker demonstrating decreased Bilat Knee Extension during Stance Phases of Gait. He demonstrates decreased Heel Strike Bilat with Bilat AFOs. Pt does not consistently pass Stance Foot with Swing Foot. with certain mo  Standing Posture: Right Shift and Forward Flexed with decreased Lumbar Lordosis. Timed Gait: 113 Feet in 1 Minute and 16 Seconds with 4-Wheeled Rolling Walker. Number of Steps in 30 Feet: 34 and 25 Steps. LE Strength: Hip Flexors Right 4/5 and Left 4+/5, Hip IR 4/5 Bilat, Hip ER 4/5, Knee Extensors 4/5 Bilat, and Knee Flexors 4/5 Bilat. SLR: 35 degrees Bilat  Hip PROM: Right Hip IR and ER produce Right Hip pain.   Right Hip Scour (+)  Standing Static Balance without UE support: Poor  Standing Dynamic Balance without UE support:

## 2023-10-06 ENCOUNTER — APPOINTMENT (OUTPATIENT)
Age: 88
End: 2023-10-06
Payer: MEDICARE

## 2023-10-09 ENCOUNTER — HOSPITAL ENCOUNTER (OUTPATIENT)
Age: 88
Setting detail: THERAPIES SERIES
Discharge: HOME | End: 2023-10-09
Payer: MEDICARE

## 2023-10-09 PROCEDURE — 97116 GAIT TRAINING THERAPY: CPT

## 2023-10-09 PROCEDURE — 97110 THERAPEUTIC EXERCISES: CPT

## 2023-10-09 NOTE — FLOWSHEET NOTE
[x] [address] [facility]  [phone]  F: [phone]             Physical Therapy Daily Treatment Note    Date:  10/09/2023  Patient Name:  Gerard Mac    :  1935  MRN: [id number]  Physician: Eufemia Cabezas MD                                 Insurance: [facility]  Medical Diagnosis: Low Back Pain                Rehab Codes: M54.50   Onset Date: January 15, 2023                                 Next 's appt: 2023  Visit# / total visits: ; Progress note for Medicare patient due at visit 30     Cancels/No Shows: 0/0    Subjective:    Pain:  [x] Yes  [] No Location: Right Thigh Pain Rating: (0-10 scale) 0/10  Pain altered Tx:  [x] No  [] Yes  Action:  Comments: Pt reports no adverse response to previous Therapy session. He states he started back on the Meloxicam Thursday with an immediate improvement in his Right Hip/Thigh pain. He denies pain at present. Objective:   Pt presents ambulating with a 4-Wheeled Rolling Walker demonstrating decreased Bilat Knee Extension during Stance Phases of Gait. He demonstrates decreased Heel Strike Bilat with Bilat AFOs. Pt does not consistently pass Stance Foot with Swing Foot. with certain mo  Standing Posture: Right Shift and Forward Flexed with decreased Lumbar Lordosis. Timed Gait: 113 Feet in 1 Minute and 16 Seconds with 4-Wheeled Rolling Walker. Number of Steps in 30 Feet: 34 and 25 Steps. LE Strength: Hip Flexors Right 4/5 and Left 4+/5, Hip IR 4/5 Bilat, Hip ER 4/5, Knee Extensors 4/5 Bilat, and Knee Flexors 4/5 Bilat. SLR: 35 degrees Bilat  Hip PROM: Right Hip IR and ER produce Right Hip pain. Right Hip Scour (+)  Standing Static Balance without UE support: Poor  Standing Dynamic Balance without UE support: Poor  Tinetti Balance Assessment Tool Score: 9/28  Above recorded at Initial Evaluation on 2023.     Pt presents ambulating with a 4-Wheeled

## 2023-10-11 ENCOUNTER — APPOINTMENT (OUTPATIENT)
Dept: CT IMAGING | Age: 88
End: 2023-10-11
Payer: MEDICARE

## 2023-10-11 ENCOUNTER — HOSPITAL ENCOUNTER (EMERGENCY)
Age: 88
Discharge: HOME | End: 2023-10-11
Attending: EMERGENCY MEDICINE
Payer: MEDICARE

## 2023-10-11 ENCOUNTER — HOSPITAL ENCOUNTER (OUTPATIENT)
Age: 88
Setting detail: THERAPIES SERIES
Discharge: HOME | End: 2023-10-11
Payer: MEDICARE

## 2023-10-11 ENCOUNTER — APPOINTMENT (OUTPATIENT)
Dept: GENERAL RADIOLOGY | Age: 88
End: 2023-10-11
Payer: MEDICARE

## 2023-10-11 VITALS
SYSTOLIC BLOOD PRESSURE: 121 MMHG | DIASTOLIC BLOOD PRESSURE: 67 MMHG | HEIGHT: 70 IN | BODY MASS INDEX: 20.9 KG/M2 | TEMPERATURE: 97.7 F | HEART RATE: 66 BPM | WEIGHT: 146 LBS | RESPIRATION RATE: 21 BRPM | OXYGEN SATURATION: 96 %

## 2023-10-11 DIAGNOSIS — R41.82 ALTERED MENTAL STATUS, UNSPECIFIED ALTERED MENTAL STATUS TYPE: Primary | ICD-10-CM

## 2023-10-11 LAB
ANION GAP SERPL CALCULATED.3IONS-SCNC: 7 MMOL/L (ref 9–17)
BACTERIA URNS QL MICRO: ABNORMAL
BASOPHILS # BLD: 0.1 K/UL (ref 0–0.2)
BASOPHILS NFR BLD: 1 % (ref 0–2)
BILIRUB UR QL STRIP: NEGATIVE
BUN SERPL-MCNC: 24 MG/DL (ref 8–23)
CALCIUM SERPL-MCNC: 9.1 MG/DL (ref 8.6–10.4)
CHARACTER UR: ABNORMAL
CHLORIDE SERPL-SCNC: 99 MMOL/L (ref 98–107)
CLARITY UR: CLEAR
CO2 SERPL-SCNC: 28 MMOL/L (ref 20–31)
COLOR UR: YELLOW
CREAT SERPL-MCNC: 0.7 MG/DL (ref 0.7–1.2)
EOSINOPHIL # BLD: 0.1 K/UL (ref 0–0.4)
EOSINOPHILS RELATIVE PERCENT: 1 % (ref 1–4)
EPI CELLS #/AREA URNS HPF: ABNORMAL /HPF (ref 0–5)
ERYTHROCYTE [DISTWIDTH] IN BLOOD BY AUTOMATED COUNT: 13.6 % (ref 12.5–15.4)
GFR SERPL CREATININE-BSD FRML MDRD: >60 ML/MIN/1.73M2
GLUCOSE SERPL-MCNC: 110 MG/DL (ref 70–99)
GLUCOSE UR STRIP-MCNC: NEGATIVE MG/DL
HCT VFR BLD AUTO: 37.3 % (ref 41–53)
HGB BLD-MCNC: 12.9 G/DL (ref 13.5–17.5)
HGB UR QL STRIP.AUTO: ABNORMAL
KETONES UR STRIP-MCNC: NEGATIVE MG/DL
LACTATE BLDV-SCNC: 0.8 MMOL/L (ref 0.5–2.2)
LEUKOCYTE ESTERASE UR QL STRIP: NEGATIVE
LYMPHOCYTES NFR BLD: 1.2 K/UL (ref 1–4.8)
LYMPHOCYTES RELATIVE PERCENT: 17 % (ref 24–44)
MCH RBC QN AUTO: 31.8 PG (ref 26–34)
MCHC RBC AUTO-ENTMCNC: 34.6 G/DL (ref 31–37)
MCV RBC AUTO: 91.7 FL (ref 80–100)
MONOCYTES NFR BLD: 0.8 K/UL (ref 0.1–1.2)
MONOCYTES NFR BLD: 11 % (ref 2–11)
MUCOUS THREADS URNS QL MICRO: ABNORMAL
NEUTROPHILS NFR BLD: 70 % (ref 36–66)
NEUTS SEG NFR BLD: 4.9 K/UL (ref 1.8–7.7)
NITRITE UR QL STRIP: NEGATIVE
PH UR STRIP: 6.5 [PH] (ref 5–8)
PLATELET # BLD AUTO: 240 K/UL (ref 140–450)
PMV BLD AUTO: 7.2 FL (ref 6–12)
POTASSIUM SERPL-SCNC: 3.7 MMOL/L (ref 3.7–5.3)
PROT UR STRIP-MCNC: NEGATIVE MG/DL
RBC # BLD AUTO: 4.06 M/UL (ref 4.5–5.9)
RBC #/AREA URNS HPF: ABNORMAL /HPF (ref 0–2)
SODIUM SERPL-SCNC: 134 MMOL/L (ref 135–144)
SP GR UR STRIP: 1.02 (ref 1–1.03)
TROPONIN I SERPL HS-MCNC: 20 NG/L (ref 0–22)
UROBILINOGEN UR STRIP-ACNC: ABNORMAL EU/DL (ref 0–1)
WBC #/AREA URNS HPF: ABNORMAL /HPF (ref 0–5)
WBC OTHER # BLD: 7 K/UL (ref 3.5–11)

## 2023-10-11 PROCEDURE — 93005 ELECTROCARDIOGRAM TRACING: CPT | Performed by: NURSE PRACTITIONER

## 2023-10-11 PROCEDURE — 71045 X-RAY EXAM CHEST 1 VIEW: CPT

## 2023-10-11 PROCEDURE — 84484 ASSAY OF TROPONIN QUANT: CPT

## 2023-10-11 PROCEDURE — 83605 ASSAY OF LACTIC ACID: CPT

## 2023-10-11 PROCEDURE — 81001 URINALYSIS AUTO W/SCOPE: CPT

## 2023-10-11 PROCEDURE — 80048 BASIC METABOLIC PNL TOTAL CA: CPT

## 2023-10-11 PROCEDURE — 87040 BLOOD CULTURE FOR BACTERIA: CPT

## 2023-10-11 PROCEDURE — 36415 COLL VENOUS BLD VENIPUNCTURE: CPT

## 2023-10-11 PROCEDURE — 70450 CT HEAD/BRAIN W/O DYE: CPT

## 2023-10-11 PROCEDURE — 99285 EMERGENCY DEPT VISIT HI MDM: CPT

## 2023-10-11 PROCEDURE — 85025 COMPLETE CBC W/AUTO DIFF WBC: CPT

## 2023-10-11 RX ORDER — FLUTICASONE PROPIONATE 50 MCG
SPRAY, SUSPENSION (ML) NASAL
COMMUNITY

## 2023-10-11 RX ORDER — PRAVASTATIN SODIUM 20 MG
TABLET ORAL
COMMUNITY
Start: 2018-10-17

## 2023-10-11 RX ORDER — MELOXICAM 7.5 MG/1
7.5 TABLET ORAL DAILY
COMMUNITY
Start: 2023-05-27

## 2023-10-11 RX ORDER — ESCITALOPRAM OXALATE 5 MG/1
TABLET ORAL
COMMUNITY
Start: 2021-03-30

## 2023-10-11 RX ORDER — AMLODIPINE BESYLATE 5 MG/1
TABLET ORAL
COMMUNITY
Start: 2017-03-07

## 2023-10-11 RX ORDER — TAMSULOSIN HYDROCHLORIDE 0.4 MG/1
0.4 CAPSULE ORAL DAILY PRN
COMMUNITY
Start: 2017-03-13

## 2023-10-11 ASSESSMENT — ENCOUNTER SYMPTOMS
DIARRHEA: 0
VOMITING: 0
CONSTIPATION: 0
COUGH: 0
ABDOMINAL PAIN: 0
SHORTNESS OF BREATH: 0
NAUSEA: 0
BACK PAIN: 0

## 2023-10-11 ASSESSMENT — PAIN - FUNCTIONAL ASSESSMENT: PAIN_FUNCTIONAL_ASSESSMENT: NONE - DENIES PAIN

## 2023-10-11 NOTE — FLOWSHEET NOTE
[x] [address]  [phone]  F: [phone]             Therapy Cancel/No Show note    Date: 10/11/2023  Patient: aYry November  : 1935  MRN: [id number]    Cancels/No Shows to date:     For today's appointment patient:    [x]  Cancelled    [] Rescheduled appointment    [] No-show     Reason given by patient:    []  Patient ill    []  Conflicting appointment    [] No transportation      [] Conflict with work    [] No reason given    [] Weather related    [] COVID-19    [x] Other:      Comments:  Therapist discovered in the chart pt was currently being evaluated in the ER for Altered Mental Status. Await findings.       [] Next appointment was confirmed    Electronically signed by: Arturo Miller PT

## 2023-10-11 NOTE — DISCHARGE INSTRUCTIONS
Home. Follow up with Dr. Colletta Moorman in 1-2 days. Return to the ER for confusion, falls, nausea, vomiting, weakness, difficulty with speech, or any other concerns.

## 2023-10-12 LAB
EKG ATRIAL RATE: 60 BPM
EKG P AXIS: 54 DEGREES
EKG P-R INTERVAL: 248 MS
EKG Q-T INTERVAL: 440 MS
EKG QRS DURATION: 148 MS
EKG QTC CALCULATION (BAZETT): 440 MS
EKG R AXIS: -80 DEGREES
EKG T AXIS: 49 DEGREES
EKG VENTRICULAR RATE: 60 BPM

## 2023-10-15 LAB
MICROORGANISM SPEC CULT: NORMAL
MICROORGANISM SPEC CULT: NORMAL
SERVICE CMNT-IMP: NORMAL
SERVICE CMNT-IMP: NORMAL
SPECIMEN DESCRIPTION: NORMAL
SPECIMEN DESCRIPTION: NORMAL

## 2024-04-02 ENCOUNTER — ANESTHESIA EVENT (OUTPATIENT)
Dept: OPERATING ROOM | Age: 89
End: 2024-04-02
Payer: COMMERCIAL

## 2024-04-02 NOTE — PROGRESS NOTES
DAY OF SURGERY/PROCEDURE  GUIDELINES    As a patient at the [facility], you can expect quality medical and nursing care that is centered on your individual needs. It is our goal to make your surgical experience as comfortable and excellent as possible.  ________________________________________________________________________    The following instructions are general guidelines, if any information on this sheet is different from what your doctor has instructed you to do, please follow your doctor's instructions.    Please arrive on 4/3/2024  @  0830      Enter through entrance C. Check in at registration     Upon arrival you will be taken to the pre-operative area to get ready for surgery, your family will stay in the waiting room and visit with you once you are ready for surgery. Due to special limitations please limit visitation to 1-2 members of your family at a time. When it is time for surgery your family will return to the waiting room.    Nothing to eat, drink, smoke, suck or chew after midnight (no water, gum, mints, cigarettes, cigars, pipes, snuff, chewing tobacco, etc.) or your surgery may be canceled.     Take a shower or bath on the morning of your surgery/procedure (Hibiclens if directed) Do not apply any lotions.    Brush your teeth, but do not swallow any water    IN CASE OF ILLNESS - If you have a cold or flu symptoms (high fever, runny nose, sore throat, cough, etc.) rash, nausea, vomiting, loose stools, and/or recent contact with someone who has a contagious disease (chick pox, measles, etc.) please call your doctor before coming to the surgery center    Take a small sip of water with heart, blood pressure, and/or seizure medication the morning of surgery.   Amlodipine    If applicable bring your:  Inhaler (s)  Hearing aid(s)  Eyeglasses and Case (If you wear contacts they have to be removed before surgery, bring case and solution)  CPAP     DO NOT take anticoagulants (blood  thinners, aspirin or aspirin-containing products) as instructed by your physician.    DO NOT take any diabetic pills or insulin morning of your surgery.     Leave all jewelry at home and wear loose, comfortable clothing that is easy to put on and take off.     If you will be returning home the same day as your surgery, you will need to have a responsible adult (18 years of age or older) present to drive you home. You will need someone stay with you at home for the first 24 hours following your surgery. This is due to the anesthesia and the medication given to you during surgery and recovery.

## 2024-04-03 ENCOUNTER — APPOINTMENT (OUTPATIENT)
Dept: GENERAL RADIOLOGY | Age: 89
End: 2024-04-03
Attending: STUDENT IN AN ORGANIZED HEALTH CARE EDUCATION/TRAINING PROGRAM
Payer: COMMERCIAL

## 2024-04-03 ENCOUNTER — HOSPITAL ENCOUNTER (OUTPATIENT)
Age: 89
Setting detail: OUTPATIENT SURGERY
Discharge: HOME | End: 2024-04-03
Attending: STUDENT IN AN ORGANIZED HEALTH CARE EDUCATION/TRAINING PROGRAM | Admitting: STUDENT IN AN ORGANIZED HEALTH CARE EDUCATION/TRAINING PROGRAM
Payer: COMMERCIAL

## 2024-04-03 ENCOUNTER — ANESTHESIA (OUTPATIENT)
Dept: OPERATING ROOM | Age: 89
End: 2024-04-03
Payer: COMMERCIAL

## 2024-04-03 VITALS
WEIGHT: 155 LBS | SYSTOLIC BLOOD PRESSURE: 109 MMHG | HEART RATE: 59 BPM | OXYGEN SATURATION: 95 % | RESPIRATION RATE: 17 BRPM | TEMPERATURE: 96.4 F | BODY MASS INDEX: 22.96 KG/M2 | HEIGHT: 69 IN | DIASTOLIC BLOOD PRESSURE: 72 MMHG

## 2024-04-03 DIAGNOSIS — S32.050A CLOSED WEDGE COMPRESSION FRACTURE OF L5 VERTEBRA, INITIAL ENCOUNTER (HCC): Primary | ICD-10-CM

## 2024-04-03 DIAGNOSIS — G89.18 POST-OP PAIN: ICD-10-CM

## 2024-04-03 LAB
ANION GAP SERPL CALCULATED.3IONS-SCNC: 13 MMOL/L (ref 9–17)
BUN SERPL-MCNC: 12 MG/DL (ref 8–23)
CALCIUM SERPL-MCNC: 9.6 MG/DL (ref 8.6–10.4)
CHLORIDE SERPL-SCNC: 101 MMOL/L (ref 98–107)
CO2 SERPL-SCNC: 23 MMOL/L (ref 20–31)
CREAT SERPL-MCNC: 0.6 MG/DL (ref 0.7–1.2)
EKG ATRIAL RATE: 83 BPM
EKG Q-T INTERVAL: 436 MS
EKG QRS DURATION: 150 MS
EKG QTC CALCULATION (BAZETT): 467 MS
EKG R AXIS: -74 DEGREES
EKG T AXIS: 64 DEGREES
EKG VENTRICULAR RATE: 69 BPM
ERYTHROCYTE [DISTWIDTH] IN BLOOD BY AUTOMATED COUNT: 13.4 % (ref 12.5–15.4)
GFR SERPL CREATININE-BSD FRML MDRD: >90 ML/MIN/1.73M2
GLUCOSE SERPL-MCNC: 107 MG/DL (ref 70–99)
HCT VFR BLD AUTO: 42.7 % (ref 41–53)
HGB BLD-MCNC: 14.9 G/DL (ref 13.5–17.5)
MCH RBC QN AUTO: 31.3 PG (ref 26–34)
MCHC RBC AUTO-ENTMCNC: 34.9 G/DL (ref 31–37)
MCV RBC AUTO: 89.6 FL (ref 80–100)
PLATELET # BLD AUTO: 294 K/UL (ref 140–450)
PMV BLD AUTO: 7.4 FL (ref 6–12)
POTASSIUM SERPL-SCNC: 3.7 MMOL/L (ref 3.7–5.3)
RBC # BLD AUTO: 4.77 M/UL (ref 4.5–5.9)
SODIUM SERPL-SCNC: 137 MMOL/L (ref 135–144)
WBC OTHER # BLD: 8.2 K/UL (ref 3.5–11)

## 2024-04-03 PROCEDURE — 2709999900 HC NON-CHARGEABLE SUPPLY: Performed by: STUDENT IN AN ORGANIZED HEALTH CARE EDUCATION/TRAINING PROGRAM

## 2024-04-03 PROCEDURE — 7100000011 HC PHASE II RECOVERY - ADDTL 15 MIN: Performed by: STUDENT IN AN ORGANIZED HEALTH CARE EDUCATION/TRAINING PROGRAM

## 2024-04-03 PROCEDURE — 2580000003 HC RX 258: Performed by: STUDENT IN AN ORGANIZED HEALTH CARE EDUCATION/TRAINING PROGRAM

## 2024-04-03 PROCEDURE — 3700000001 HC ADD 15 MINUTES (ANESTHESIA): Performed by: STUDENT IN AN ORGANIZED HEALTH CARE EDUCATION/TRAINING PROGRAM

## 2024-04-03 PROCEDURE — 3700000000 HC ANESTHESIA ATTENDED CARE: Performed by: STUDENT IN AN ORGANIZED HEALTH CARE EDUCATION/TRAINING PROGRAM

## 2024-04-03 PROCEDURE — C1894 INTRO/SHEATH, NON-LASER: HCPCS | Performed by: STUDENT IN AN ORGANIZED HEALTH CARE EDUCATION/TRAINING PROGRAM

## 2024-04-03 PROCEDURE — 80048 BASIC METABOLIC PNL TOTAL CA: CPT

## 2024-04-03 PROCEDURE — 2500000003 HC RX 250 WO HCPCS: Performed by: STUDENT IN AN ORGANIZED HEALTH CARE EDUCATION/TRAINING PROGRAM

## 2024-04-03 PROCEDURE — 85027 COMPLETE CBC AUTOMATED: CPT

## 2024-04-03 PROCEDURE — 7100000001 HC PACU RECOVERY - ADDTL 15 MIN: Performed by: STUDENT IN AN ORGANIZED HEALTH CARE EDUCATION/TRAINING PROGRAM

## 2024-04-03 PROCEDURE — 7100000010 HC PHASE II RECOVERY - FIRST 15 MIN: Performed by: STUDENT IN AN ORGANIZED HEALTH CARE EDUCATION/TRAINING PROGRAM

## 2024-04-03 PROCEDURE — 6360000002 HC RX W HCPCS: Performed by: NURSE ANESTHETIST, CERTIFIED REGISTERED

## 2024-04-03 PROCEDURE — 2500000003 HC RX 250 WO HCPCS: Performed by: NURSE ANESTHETIST, CERTIFIED REGISTERED

## 2024-04-03 PROCEDURE — C1713 ANCHOR/SCREW BN/BN,TIS/BN: HCPCS | Performed by: STUDENT IN AN ORGANIZED HEALTH CARE EDUCATION/TRAINING PROGRAM

## 2024-04-03 PROCEDURE — 93005 ELECTROCARDIOGRAM TRACING: CPT | Performed by: ANESTHESIOLOGY

## 2024-04-03 PROCEDURE — 3600000002 HC SURGERY LEVEL 2 BASE: Performed by: STUDENT IN AN ORGANIZED HEALTH CARE EDUCATION/TRAINING PROGRAM

## 2024-04-03 PROCEDURE — 6360000004 HC RX CONTRAST MEDICATION: Performed by: STUDENT IN AN ORGANIZED HEALTH CARE EDUCATION/TRAINING PROGRAM

## 2024-04-03 PROCEDURE — 3600000012 HC SURGERY LEVEL 2 ADDTL 15MIN: Performed by: STUDENT IN AN ORGANIZED HEALTH CARE EDUCATION/TRAINING PROGRAM

## 2024-04-03 PROCEDURE — 6360000002 HC RX W HCPCS: Performed by: STUDENT IN AN ORGANIZED HEALTH CARE EDUCATION/TRAINING PROGRAM

## 2024-04-03 PROCEDURE — 7100000000 HC PACU RECOVERY - FIRST 15 MIN: Performed by: STUDENT IN AN ORGANIZED HEALTH CARE EDUCATION/TRAINING PROGRAM

## 2024-04-03 DEVICE — CEMENT C01A KYPHX HV-R BONE CEMENT EN
Type: IMPLANTABLE DEVICE | Site: SPINE LUMBAR | Status: FUNCTIONAL
Brand: KYPHON® HV-R® BONE CEMENT

## 2024-04-03 RX ORDER — KETOROLAC TROMETHAMINE 30 MG/ML
INJECTION, SOLUTION INTRAMUSCULAR; INTRAVENOUS PRN
Status: DISCONTINUED | OUTPATIENT
Start: 2024-04-03 | End: 2024-04-03 | Stop reason: SDUPTHER

## 2024-04-03 RX ORDER — SODIUM CHLORIDE 9 MG/ML
INJECTION, SOLUTION INTRAVENOUS PRN
Status: CANCELLED | OUTPATIENT
Start: 2024-04-03

## 2024-04-03 RX ORDER — CEFAZOLIN 2 G/1
INJECTION, POWDER, FOR SOLUTION INTRAMUSCULAR; INTRAVENOUS
Status: DISCONTINUED
Start: 2024-04-03 | End: 2024-04-03 | Stop reason: HOSPADM

## 2024-04-03 RX ORDER — LIDOCAINE HYDROCHLORIDE 10 MG/ML
1 INJECTION, SOLUTION EPIDURAL; INFILTRATION; INTRACAUDAL; PERINEURAL
Status: DISCONTINUED | OUTPATIENT
Start: 2024-04-03 | End: 2024-04-03 | Stop reason: HOSPADM

## 2024-04-03 RX ORDER — ACETAMINOPHEN AND CODEINE PHOSPHATE 300; 30 MG/1; MG/1
1 TABLET ORAL EVERY 8 HOURS PRN
Qty: 15 TABLET | Refills: 0 | Status: SHIPPED | OUTPATIENT
Start: 2024-04-03 | End: 2024-04-08

## 2024-04-03 RX ORDER — NALOXONE HYDROCHLORIDE 0.4 MG/ML
INJECTION, SOLUTION INTRAMUSCULAR; INTRAVENOUS; SUBCUTANEOUS PRN
Status: CANCELLED | OUTPATIENT
Start: 2024-04-03

## 2024-04-03 RX ORDER — DOCUSATE SODIUM 100 MG/1
100 CAPSULE, LIQUID FILLED ORAL 2 TIMES DAILY PRN
Qty: 60 CAPSULE | Refills: 1 | Status: SHIPPED | OUTPATIENT
Start: 2024-04-03

## 2024-04-03 RX ORDER — HYDRALAZINE HYDROCHLORIDE 20 MG/ML
10 INJECTION INTRAMUSCULAR; INTRAVENOUS
Status: CANCELLED | OUTPATIENT
Start: 2024-04-03

## 2024-04-03 RX ORDER — LIDOCAINE HYDROCHLORIDE 10 MG/ML
INJECTION, SOLUTION INFILTRATION; PERINEURAL PRN
Status: DISCONTINUED | OUTPATIENT
Start: 2024-04-03 | End: 2024-04-03 | Stop reason: SDUPTHER

## 2024-04-03 RX ORDER — ONDANSETRON 2 MG/ML
INJECTION INTRAMUSCULAR; INTRAVENOUS PRN
Status: DISCONTINUED | OUTPATIENT
Start: 2024-04-03 | End: 2024-04-03 | Stop reason: SDUPTHER

## 2024-04-03 RX ORDER — GLYCOPYRROLATE 1 MG/5 ML
SYRINGE (ML) INTRAVENOUS PRN
Status: DISCONTINUED | OUTPATIENT
Start: 2024-04-03 | End: 2024-04-03 | Stop reason: SDUPTHER

## 2024-04-03 RX ORDER — SODIUM CHLORIDE 0.9 % (FLUSH) 0.9 %
5-40 SYRINGE (ML) INJECTION PRN
Status: DISCONTINUED | OUTPATIENT
Start: 2024-04-03 | End: 2024-04-03 | Stop reason: HOSPADM

## 2024-04-03 RX ORDER — SODIUM CHLORIDE 0.9 % (FLUSH) 0.9 %
5-40 SYRINGE (ML) INJECTION EVERY 12 HOURS SCHEDULED
Status: DISCONTINUED | OUTPATIENT
Start: 2024-04-03 | End: 2024-04-03 | Stop reason: HOSPADM

## 2024-04-03 RX ORDER — SODIUM CHLORIDE, SODIUM LACTATE, POTASSIUM CHLORIDE, CALCIUM CHLORIDE 600; 310; 30; 20 MG/100ML; MG/100ML; MG/100ML; MG/100ML
INJECTION, SOLUTION INTRAVENOUS CONTINUOUS
Status: DISCONTINUED | OUTPATIENT
Start: 2024-04-03 | End: 2024-04-03 | Stop reason: HOSPADM

## 2024-04-03 RX ORDER — SODIUM CHLORIDE 0.9 % (FLUSH) 0.9 %
5-40 SYRINGE (ML) INJECTION EVERY 12 HOURS SCHEDULED
Status: CANCELLED | OUTPATIENT
Start: 2024-04-03

## 2024-04-03 RX ORDER — SODIUM CHLORIDE 0.9 % (FLUSH) 0.9 %
5-40 SYRINGE (ML) INJECTION PRN
Status: CANCELLED | OUTPATIENT
Start: 2024-04-03

## 2024-04-03 RX ORDER — PROPOFOL 10 MG/ML
INJECTION, EMULSION INTRAVENOUS PRN
Status: DISCONTINUED | OUTPATIENT
Start: 2024-04-03 | End: 2024-04-03 | Stop reason: SDUPTHER

## 2024-04-03 RX ORDER — PROCHLORPERAZINE EDISYLATE 5 MG/ML
5 INJECTION INTRAMUSCULAR; INTRAVENOUS
Status: CANCELLED | OUTPATIENT
Start: 2024-04-03 | End: 2024-04-04

## 2024-04-03 RX ORDER — LABETALOL HYDROCHLORIDE 5 MG/ML
10 INJECTION, SOLUTION INTRAVENOUS
Status: CANCELLED | OUTPATIENT
Start: 2024-04-03

## 2024-04-03 RX ORDER — FENTANYL CITRATE 50 UG/ML
INJECTION, SOLUTION INTRAMUSCULAR; INTRAVENOUS PRN
Status: DISCONTINUED | OUTPATIENT
Start: 2024-04-03 | End: 2024-04-03 | Stop reason: SDUPTHER

## 2024-04-03 RX ORDER — ROCURONIUM BROMIDE 10 MG/ML
INJECTION, SOLUTION INTRAVENOUS PRN
Status: DISCONTINUED | OUTPATIENT
Start: 2024-04-03 | End: 2024-04-03 | Stop reason: SDUPTHER

## 2024-04-03 RX ORDER — SODIUM CHLORIDE 9 MG/ML
INJECTION, SOLUTION INTRAVENOUS PRN
Status: DISCONTINUED | OUTPATIENT
Start: 2024-04-03 | End: 2024-04-03 | Stop reason: HOSPADM

## 2024-04-03 RX ORDER — DEXAMETHASONE SODIUM PHOSPHATE 10 MG/ML
INJECTION, SOLUTION INTRAMUSCULAR; INTRAVENOUS PRN
Status: DISCONTINUED | OUTPATIENT
Start: 2024-04-03 | End: 2024-04-03 | Stop reason: SDUPTHER

## 2024-04-03 RX ORDER — BUPIVACAINE HYDROCHLORIDE AND EPINEPHRINE 5; 5 MG/ML; UG/ML
INJECTION, SOLUTION EPIDURAL; INTRACAUDAL; PERINEURAL PRN
Status: DISCONTINUED | OUTPATIENT
Start: 2024-04-03 | End: 2024-04-03 | Stop reason: ALTCHOICE

## 2024-04-03 RX ADMIN — PHENYLEPHRINE HYDROCHLORIDE 100 MCG: 10 INJECTION INTRAVENOUS at 10:51

## 2024-04-03 RX ADMIN — CEFAZOLIN 2000 MG: 2 INJECTION, POWDER, FOR SOLUTION INTRAMUSCULAR; INTRAVENOUS at 10:50

## 2024-04-03 RX ADMIN — KETOROLAC TROMETHAMINE 15 MG: 30 INJECTION, SOLUTION INTRAMUSCULAR; INTRAVENOUS at 11:17

## 2024-04-03 RX ADMIN — PHENYLEPHRINE HYDROCHLORIDE 150 MCG: 10 INJECTION INTRAVENOUS at 11:09

## 2024-04-03 RX ADMIN — SUGAMMADEX 150 MG: 100 INJECTION, SOLUTION INTRAVENOUS at 11:21

## 2024-04-03 RX ADMIN — ROCURONIUM BROMIDE 30 MG: 10 INJECTION, SOLUTION INTRAVENOUS at 10:38

## 2024-04-03 RX ADMIN — Medication 0.2 MG: at 10:49

## 2024-04-03 RX ADMIN — PHENYLEPHRINE HYDROCHLORIDE 50 MCG: 10 INJECTION INTRAVENOUS at 10:55

## 2024-04-03 RX ADMIN — PROPOFOL 100 MG: 10 INJECTION, EMULSION INTRAVENOUS at 10:38

## 2024-04-03 RX ADMIN — PROPOFOL 20 MG: 10 INJECTION, EMULSION INTRAVENOUS at 11:03

## 2024-04-03 RX ADMIN — ONDANSETRON 4 MG: 2 INJECTION INTRAMUSCULAR; INTRAVENOUS at 10:45

## 2024-04-03 RX ADMIN — FENTANYL CITRATE 25 MCG: 50 INJECTION, SOLUTION INTRAMUSCULAR; INTRAVENOUS at 10:38

## 2024-04-03 RX ADMIN — DEXAMETHASONE SODIUM PHOSPHATE 10 MG: 10 INJECTION, SOLUTION INTRAMUSCULAR; INTRAVENOUS at 10:45

## 2024-04-03 RX ADMIN — SODIUM CHLORIDE, POTASSIUM CHLORIDE, SODIUM LACTATE AND CALCIUM CHLORIDE: 600; 310; 30; 20 INJECTION, SOLUTION INTRAVENOUS at 09:53

## 2024-04-03 RX ADMIN — PHENYLEPHRINE HYDROCHLORIDE 100 MCG: 10 INJECTION INTRAVENOUS at 11:15

## 2024-04-03 RX ADMIN — LIDOCAINE HYDROCHLORIDE 40 MG: 10 INJECTION, SOLUTION INFILTRATION; PERINEURAL at 10:38

## 2024-04-03 ASSESSMENT — PAIN - FUNCTIONAL ASSESSMENT
PAIN_FUNCTIONAL_ASSESSMENT: NONE - DENIES PAIN
PAIN_FUNCTIONAL_ASSESSMENT: 0-10

## 2024-04-03 ASSESSMENT — PAIN SCALES - GENERAL: PAINLEVEL_OUTOF10: 0

## 2024-04-03 NOTE — ANESTHESIA PRE PROCEDURE
Department of Anesthesiology  Preprocedure Note       Name:  Rodney Mathur   Age:  88 y.o.  :  1935                                          MRN:  [id number]         Date:  4/3/2024      Surgeon: Surgeon(s):  Fabián Rosario DO    Procedure: Procedure(s):  KYPHOPLASTY L5    Medications prior to admission:   Prior to Admission medications    Medication Sig Start Date End Date Taking? Authorizing Provider   apixaban (ELIQUIS) 2.5 MG TABS tablet Take 1 tablet by mouth 2 times daily    Sebastian Elizondo MD   amLODIPine (NORVASC) 5 MG tablet 1 tablet Orally Daily for 90 day(s) 3/7/17   Sebastian Elizondo MD   escitalopram (LEXAPRO) 5 MG tablet 1 tablet Orally Daily for 90 day(s) 3/30/21   Sebastian Elizondo MD   fluticasone (FLONASE ALLERGY RELIEF) 50 MCG/ACT nasal spray 1 spray in each nostril as needed Nasally Once a day for 30 days  Patient not taking: Reported on 2024    Sebastian Elizondo MD   tamsulosin (FLOMAX) 0.4 MG capsule Take 1 capsule by mouth daily as needed  Patient not taking: Reported on 2024 3/13/17   Sebastian Elizondo MD   pravastatin (PRAVACHOL) 20 MG tablet 1 tablet in the evening Orally Once a day for 90 day(s) 10/17/18   Sebastian Elizondo MD   meloxicam (MOBIC) 7.5 MG tablet Take 1 tablet by mouth daily  Patient not taking: Reported on 4/3/2024 5/27/23   Sebastian Elizondo MD   gabapentin (NEURONTIN) 300 MG capsule Take 1 po bid  Patient not taking: Reported on 2024 11/12/15   Abraham Osorio MD   F-[id number] (METANX) 3-35-2 MG tablet Take 1 po qd 11/12/15 11/11/16  Abraham Osorio MD   Elastic Bandages & Supports (JOBST KNEE HIGH COMPRESSION SM) MISC 1 each by Does not apply route daily. 20-30 mmHG  Dx: Orthostasis, near syncope 14   Abraham Osorio MD   aspirin 81 MG EC tablet Take 1 tablet by mouth daily    ProviderSebastian MD   mesalamine (ASACOL) 400 MG EC tablet Take 800 mg by mouth daily.  Patient not

## 2024-04-03 NOTE — DISCHARGE INSTRUCTIONS
Activity  You have had anesthesia today  Do not drive, operate heavy equipment, consume alcoholic beverages, or make any important decisions  for 24 hours   If you are taking pain medication: Do not drive or consume alcohol.  Take your time changing positions today. You may feel light headed or dizzy if you move too quickly.   Continue your home medications as ordered by your physician.  Diet   You can eat your normal diet when you feel well. You should start off with bland foods like chicken soup, toast, or yogurt. Then advance as tolerated.  Drink plenty of fluids (unless your doctor tells you not to). Your urine should be very lightly colored without a strong odor.     Orthopedic Spine Discharge Instructions:  -Mobilize as tolerated. Avoid Bending, Lifting, and Twisting (BLT) as much as possible.  -Maintain surgical dressing in place until follow-up if possible. May shower in 48 hours, let water run over dressings, but do not scrub. If dressings comes off with activity and hygiene, may leave uncovered if no drainage. Otherwise may replace Band-Aids.  -Ice (20 minutes on and off 1 hour) as needed for swelling/pain.  -Drink plenty of fluids.  -Call the office or come to Emergency Room if signs of infection appear (hot, swollen, red, draining pus, fever).  -Take medications as prescribed.  -Wean off narcotics (Percocet/Norco) as soon as possible. Do not take Tylenol if still taking narcotics.  -No alcoholic beverages or driving/operating machinery while on narcotics  -Follow up with Dr. Rosario in his office 2 weeks after surgery/discharge. Call [phone] to schedule.

## 2024-04-03 NOTE — ANESTHESIA POSTPROCEDURE EVALUATION
Department of Anesthesiology  Postprocedure Note    Patient: Rodney Mathur  MRN: [id number]  YOB: 1935  Date of evaluation: 4/3/2024    Procedure Summary       Date: 04/03/24 Room / Location: [address]    Anesthesia Start: 1035 Anesthesia Stop: 1132    Procedure: KYPHOPLASTY L5 Diagnosis:       Compression fracture of L5 vertebra, initial encounter ([facility])      (Compression fracture of L5 vertebra, initial encounter ([facility]) [S32.050A])    Surgeons: Fabián Rosario DO Responsible Provider: Candi Siegel MD    Anesthesia Type: general ASA Status: 3            Anesthesia Type: No value filed.    Finesse Phase I: Finesse Score: 8    Finesse Phase II:      Anesthesia Post Evaluation    Patient location during evaluation: PACU  Patient participation: complete - patient participated  Level of consciousness: awake and alert  Airway patency: patent  Nausea & Vomiting: no nausea and no vomiting  Cardiovascular status: hemodynamically stable  Respiratory status: room air and spontaneous ventilation  Hydration status: euvolemic  Multimodal analgesia pain management approach  Pain management: adequate    No notable events documented.

## 2024-04-03 NOTE — H&P
Surgical H&P    Reason for surgery: The patient is a 88 y.o. male with history of lumbar decompression and kyphoplasty nearly 2 years ago who recently had a fall and since has had significant lower back pain. X-rays and MRI confirmed acute L5 compression fracture. He has significant back pain worse with any position changes in bed or mobilization. Treatment options had been discussed for non operative versus kyphoplasty. He presents today for L5 kyphoplasty.    Past Medical History:    Past Medical History:   Diagnosis Date    Alcoholic polyneuropathy (HCC)     Arthritis     Atrial fibrillation (HCC)     Back pain     Cataract     CVA (cerebral vascular accident) (HCC)     Diverticulitis     DM (diabetes mellitus) (HCC)     diet controlled    GERD (gastroesophageal reflux disease)     Hx of skin cancer, basal cell     top of head    Hypercholesterolemia     Hypertension     Kidney stone     Syncope and collapse     TIA (transient ischemic attack)     Ulcerative colitis (HCC)     Unspecified hereditary and idiopathic peripheral neuropathy      Past Surgical History:    Past Surgical History:   Procedure Laterality Date    ABDOMEN SURGERY      APPENDECTOMY  01/01/1974    BACK SURGERY  2023    kyphoplasty    CARDIAC ASSIST DEVICE INSERTION  2019    watchman device put in for TIA    CATARACT REMOVAL      COLON SURGERY      colectomy diverticulitis    HERNIA REPAIR      2009 and 2012    LEG SURGERY  01/01/1947    dog bites    REFRACTIVE SURGERY  01/01/2001    TONSILLECTOMY  01/01/1945     Medications Prior to Admission:   Prior to Admission medications    Medication Sig Start Date End Date Taking? Authorizing Provider   apixaban (ELIQUIS) 2.5 MG TABS tablet Take 1 tablet by mouth 2 times daily    ProviderSebastian MD   amLODIPine (NORVASC) 5 MG tablet 1 tablet Orally Daily for 90 day(s) 3/7/17   Sebastian Elizondo MD   escitalopram (LEXAPRO) 5 MG tablet 1 tablet Orally Daily for 90 day(s) 3/30/21   Mikhail

## 2024-04-03 NOTE — BRIEF OP NOTE
Brief Postoperative Note      Patient: Rodney Mathur  YOB: 1935  MRN: [id number]    Date of Procedure: 4/3/2024    Pre-Op Diagnosis Codes:     * Compression fracture of L5 vertebra, initial encounter ([facility]) [S32.050A]    Post-Op Diagnosis: Same       Procedure(s):  KYPHOPLASTY L5 [28217]    Surgeon(s):  Fabián Rosario DO    Assistant:  * No surgical staff found *    Anesthesia: General    Estimated Blood Loss (mL): Minimal    Complications: None    Specimens:   * No specimens in log *    Implants:  Implant Name Type Inv. Item Serial No.  Lot No. LRB No. Used Action   CEMENT BNE HI VISC RADIOPAQUE KYPHON HV-R - [id number]  CEMENT BNE HI VISC RADIOPAQUE KYPHON HV-R  [employer] DANEK-WD [id number] N/A 1 Implanted     Drains: * No LDAs found *    Findings: L5 compression fracture    Electronically signed by Fabián Rosario DO on 4/3/2024 at 11:47 AM

## 2024-04-04 LAB
EKG ATRIAL RATE: 76 BPM
EKG Q-T INTERVAL: 434 MS
EKG QRS DURATION: 150 MS
EKG QTC CALCULATION (BAZETT): 468 MS
EKG R AXIS: -75 DEGREES
EKG T AXIS: 66 DEGREES
EKG VENTRICULAR RATE: 70 BPM

## (undated) DEVICE — MIXER A07A CEMENT

## (undated) DEVICE — INSERT CUSH STD PRONEVIEW

## (undated) DEVICE — SVMMC KYPHOPLASTY PK

## (undated) DEVICE — GLOVE SURG SZ 8 THK118MIL BLK LTX FREE POLYISOPRENE BEAD CUF

## (undated) DEVICE — 1010 S-DRAPE TOWEL DRAPE 10/BX: Brand: STERI-DRAPE™

## (undated) DEVICE — GOWN,SIRUS,POLYRNF,BRTHSLV,2XL,18/CS: Brand: MEDLINE

## (undated) DEVICE — INTENDED FOR TISSUE SEPARATION, AND OTHER PROCEDURES THAT REQUIRE A SHARP SURGICAL BLADE TO PUNCTURE OR CUT.: Brand: BARD-PARKER ® CARBON RIB-BACK BLADES

## (undated) DEVICE — MASTISOL ADHESIVE LIQ 2/3ML

## (undated) DEVICE — CONTAINER,SPECIMEN,4OZ,OR STRL: Brand: MEDLINE

## (undated) DEVICE — JACKSON / MODULAR SPINAL TABLE STYLE POSITIONING KIT - STANDARD: Brand: SOULE MEDICAL

## (undated) DEVICE — SOLUTION IRRIG 1000ML 0.9% SOD CHL USP POUR PLAS BTL

## (undated) DEVICE — STRIP,CLOSURE,WOUND,MEDI-STRIP,1/2X4: Brand: MEDLINE

## (undated) DEVICE — BONE TAMP KIT KPX153PB FF X2 15/3 1 STP: Brand: KYPHOPAK™ TRAY

## (undated) DEVICE — BANDAGE ADH W1XL3IN UNIV PLAS NAT GEN USE STRP